# Patient Record
Sex: MALE | Race: WHITE | NOT HISPANIC OR LATINO | Employment: OTHER | URBAN - METROPOLITAN AREA
[De-identification: names, ages, dates, MRNs, and addresses within clinical notes are randomized per-mention and may not be internally consistent; named-entity substitution may affect disease eponyms.]

---

## 2019-01-30 ENCOUNTER — CONVERSION ENCOUNTER (OUTPATIENT)
Dept: FAMILY MEDICINE CLINIC | Facility: CLINIC | Age: 49
End: 2019-01-30

## 2019-01-30 ENCOUNTER — OFFICE VISIT CONVERTED (OUTPATIENT)
Dept: FAMILY MEDICINE CLINIC | Facility: CLINIC | Age: 49
End: 2019-01-30
Attending: NURSE PRACTITIONER

## 2020-02-27 ENCOUNTER — OFFICE VISIT CONVERTED (OUTPATIENT)
Dept: FAMILY MEDICINE CLINIC | Facility: CLINIC | Age: 50
End: 2020-02-27
Attending: NURSE PRACTITIONER

## 2020-03-23 ENCOUNTER — HOSPITAL ENCOUNTER (OUTPATIENT)
Dept: URGENT CARE | Facility: CLINIC | Age: 50
Discharge: HOME | End: 2020-03-23
Attending: NURSE PRACTITIONER

## 2020-03-25 ENCOUNTER — TELEMEDICINE CONVERTED (OUTPATIENT)
Dept: FAMILY MEDICINE CLINIC | Facility: CLINIC | Age: 50
End: 2020-03-25
Attending: NURSE PRACTITIONER

## 2020-03-31 ENCOUNTER — OFFICE VISIT CONVERTED (OUTPATIENT)
Dept: PODIATRY | Facility: CLINIC | Age: 50
End: 2020-03-31
Attending: PODIATRIST

## 2020-04-10 ENCOUNTER — HOSPITAL ENCOUNTER (OUTPATIENT)
Dept: FAMILY MEDICINE CLINIC | Facility: CLINIC | Age: 50
Discharge: HOME | End: 2020-04-10
Attending: NURSE PRACTITIONER

## 2020-04-10 LAB
BASOPHILS # BLD AUTO: 0.1 10*3/UL (ref 0–0.2)
BASOPHILS NFR BLD AUTO: 0.8 % (ref 0–3)
CONV ABS IMM GRAN: 0.04 10*3/UL (ref 0–0.2)
CONV IMMATURE GRAN: 0.3 % (ref 0–1.8)
DEPRECATED RDW RBC AUTO: 48.1 FL (ref 35.1–43.9)
EOSINOPHIL # BLD AUTO: 0.18 10*3/UL (ref 0–0.7)
EOSINOPHIL # BLD AUTO: 1.4 % (ref 0–7)
ERYTHROCYTE [DISTWIDTH] IN BLOOD BY AUTOMATED COUNT: 13.2 % (ref 11.6–14.4)
HCT VFR BLD AUTO: 50.8 % (ref 42–52)
HGB BLD-MCNC: 16.9 G/DL (ref 14–18)
LYMPHOCYTES # BLD AUTO: 3.15 10*3/UL (ref 1–5)
LYMPHOCYTES NFR BLD AUTO: 25.2 % (ref 20–45)
MCH RBC QN AUTO: 32.9 PG (ref 27–31)
MCHC RBC AUTO-ENTMCNC: 33.3 G/DL (ref 33–37)
MCV RBC AUTO: 98.8 FL (ref 80–96)
MONOCYTES # BLD AUTO: 0.85 10*3/UL (ref 0.2–1.2)
MONOCYTES NFR BLD AUTO: 6.8 % (ref 3–10)
NEUTROPHILS # BLD AUTO: 8.16 10*3/UL (ref 2–8)
NEUTROPHILS NFR BLD AUTO: 65.5 % (ref 30–85)
NRBC CBCN: 0 % (ref 0–0.7)
PLATELET # BLD AUTO: 228 10*3/UL (ref 130–400)
PMV BLD AUTO: 10.4 FL (ref 9.4–12.4)
RBC # BLD AUTO: 5.14 10*6/UL (ref 4.7–6.1)
WBC # BLD AUTO: 12.48 10*3/UL (ref 4.8–10.8)

## 2020-11-09 ENCOUNTER — OFFICE VISIT CONVERTED (OUTPATIENT)
Dept: FAMILY MEDICINE CLINIC | Facility: CLINIC | Age: 50
End: 2020-11-09
Attending: NURSE PRACTITIONER

## 2020-11-18 ENCOUNTER — CONVERSION ENCOUNTER (OUTPATIENT)
Dept: FAMILY MEDICINE CLINIC | Facility: CLINIC | Age: 50
End: 2020-11-18

## 2020-11-18 ENCOUNTER — OFFICE VISIT CONVERTED (OUTPATIENT)
Dept: FAMILY MEDICINE CLINIC | Facility: CLINIC | Age: 50
End: 2020-11-18
Attending: NURSE PRACTITIONER

## 2020-11-18 ENCOUNTER — HOSPITAL ENCOUNTER (OUTPATIENT)
Dept: FAMILY MEDICINE CLINIC | Facility: CLINIC | Age: 50
Discharge: HOME | End: 2020-11-18
Attending: NURSE PRACTITIONER

## 2020-11-18 LAB
ALBUMIN SERPL-MCNC: 4.4 G/DL (ref 3.5–5)
ALBUMIN/GLOB SERPL: 1.4 {RATIO} (ref 1.4–2.6)
ALP SERPL-CCNC: 81 U/L (ref 56–119)
ALT SERPL-CCNC: 49 U/L (ref 10–40)
ANION GAP SERPL CALC-SCNC: 20 MMOL/L (ref 8–19)
AST SERPL-CCNC: 29 U/L (ref 15–50)
BASOPHILS # BLD AUTO: 0.1 10*3/UL (ref 0–0.2)
BASOPHILS NFR BLD AUTO: 0.9 % (ref 0–3)
BILIRUB SERPL-MCNC: 0.51 MG/DL (ref 0.2–1.3)
BUN SERPL-MCNC: 11 MG/DL (ref 5–25)
BUN/CREAT SERPL: 11 {RATIO} (ref 6–20)
CALCIUM SERPL-MCNC: 9.9 MG/DL (ref 8.7–10.4)
CHLORIDE SERPL-SCNC: 105 MMOL/L (ref 99–111)
CHOLEST SERPL-MCNC: 185 MG/DL (ref 107–200)
CHOLEST/HDLC SERPL: 4.3 {RATIO} (ref 3–6)
CONV ABS IMM GRAN: 0.05 10*3/UL (ref 0–0.2)
CONV CO2: 23 MMOL/L (ref 22–32)
CONV IMMATURE GRAN: 0.4 % (ref 0–1.8)
CONV TOTAL PROTEIN: 7.6 G/DL (ref 6.3–8.2)
CREAT UR-MCNC: 1.02 MG/DL (ref 0.7–1.2)
DEPRECATED RDW RBC AUTO: 48.1 FL (ref 35.1–43.9)
EOSINOPHIL # BLD AUTO: 0.15 10*3/UL (ref 0–0.7)
EOSINOPHIL # BLD AUTO: 1.3 % (ref 0–7)
ERYTHROCYTE [DISTWIDTH] IN BLOOD BY AUTOMATED COUNT: 13.2 % (ref 11.6–14.4)
GFR SERPLBLD BASED ON 1.73 SQ M-ARVRAT: >60 ML/MIN/{1.73_M2}
GLOBULIN UR ELPH-MCNC: 3.2 G/DL (ref 2–3.5)
GLUCOSE SERPL-MCNC: 86 MG/DL (ref 70–99)
HCT VFR BLD AUTO: 52.6 % (ref 42–52)
HDLC SERPL-MCNC: 43 MG/DL (ref 40–60)
HGB BLD-MCNC: 17.9 G/DL (ref 14–18)
LDLC SERPL CALC-MCNC: 114 MG/DL (ref 70–100)
LYMPHOCYTES # BLD AUTO: 3.38 10*3/UL (ref 1–5)
LYMPHOCYTES NFR BLD AUTO: 29.4 % (ref 20–45)
MCH RBC QN AUTO: 33.7 PG (ref 27–31)
MCHC RBC AUTO-ENTMCNC: 34 G/DL (ref 33–37)
MCV RBC AUTO: 99.1 FL (ref 80–96)
MONOCYTES # BLD AUTO: 0.8 10*3/UL (ref 0.2–1.2)
MONOCYTES NFR BLD AUTO: 7 % (ref 3–10)
NEUTROPHILS # BLD AUTO: 7 10*3/UL (ref 2–8)
NEUTROPHILS NFR BLD AUTO: 61 % (ref 30–85)
NRBC CBCN: 0 % (ref 0–0.7)
OSMOLALITY SERPL CALC.SUM OF ELEC: 297 MOSM/KG (ref 273–304)
PLATELET # BLD AUTO: 245 10*3/UL (ref 130–400)
PMV BLD AUTO: 10.7 FL (ref 9.4–12.4)
POTASSIUM SERPL-SCNC: 4.4 MMOL/L (ref 3.5–5.3)
PSA SERPL-MCNC: 2.48 NG/ML (ref 0–4)
RBC # BLD AUTO: 5.31 10*6/UL (ref 4.7–6.1)
SODIUM SERPL-SCNC: 144 MMOL/L (ref 135–147)
TRIGL SERPL-MCNC: 139 MG/DL (ref 40–150)
TSH SERPL-ACNC: 1.9 M[IU]/L (ref 0.27–4.2)
VLDLC SERPL-MCNC: 28 MG/DL (ref 5–37)
WBC # BLD AUTO: 11.48 10*3/UL (ref 4.8–10.8)

## 2021-05-10 NOTE — H&P
History and Physical      Patient Name: Madi Rehman   Patient ID: [id number]   Sex: Male   YOB: 1970    Primary Care Provider: Audrey CORNEJO   Referring Provider: Audrey CORNEJO    Visit Date: March 31, 2020    Provider: Paresh Hathaway DPM   Location: [facility]   Location Address: [address] [place] [address]  [id number]   Location Phone: [phone]          Chief Complaint  · Right Foot Pain      History Of Present Illness  Madi Rehman presents to the office today for evaluation and treatment of      New, Established, New Problem: New  Location: Right third webspace  Duration: 3-week  Onset: Insidious  Nature: Sore  Stable, worsening, improving: Improved  Aggravating factors:  Patient relates pain is aggravated by shoe gear and ambulation.    Previous Treatment: P.o. antibiotics  Patient denies any fevers, chills, nausea, vomiting, shortness of breathe, nor any other constitutional signs nor symptoms.    Patient states he had edema and erythema in his right foot radiating third webspace.  He states this is significantly improved since taking antibiotics.  He states he has completed these.    Patient is retired from the [employer] and currently works at [employer] on [employer]    Referred from his primary care provider.       Past Medical History  Allergies; Cancer; Heel pain; High blood pressure         Past Surgical History  Toe Removal         Medication List  diclofenac sodium 50 mg oral tablet,delayed release (DR/EC)         Allergy List  NO KNOWN DRUG ALLERGIES         Family Medical History  Heart Disease; Family history of lung cancer         Social History  Alcohol (Current some day); lives with spouse; Tobacco (Current every day)         Review of Systems  · Constitutional  o Denies  o : fatigue, night sweats  · Eyes  o Denies  o : double vision, blurred vision  · HENT  o Denies  o : vertigo, recent head  "injury  · Cardiovascular  o Denies  o : chest pain, irregular heart beats  · Respiratory  o Denies  o : shortness of breath, productive cough  · Gastrointestinal  o Denies  o : nausea, vomiting  · Genitourinary  o Denies  o : dysuria, urinary retention  · Integument  o * See HPI  · Neurologic  o Denies  o : altered mental status, seizures  · Musculoskeletal  o Denies  o : joint swelling, limitation of motion  · Endocrine  o Denies  o : cold intolerance, heat intolerance  · Heme-Lymph  o Denies  o : petechiae, lymph node enlargement or tenderness  · Allergic-Immunologic  o Denies  o : frequent illnesses      Vitals  Date Time BP Position Site L\R Cuff Size HR RR TEMP (F) WT  HT  BMI kg/m2 BSA m2 O2 Sat HC       03/31/2020 10:10 /84 Sitting    89 - R   168lbs 0oz 5'  8\" 25.54 1.91 99 %    03/31/2020 10:10 /81 Sitting                     Physical Examination  · Constitutional  o Appearance  o : well developed, well-nourished, no obvious deformities present  · Cardiovascular  o Peripheral Vascular System  o :   § Pedal Pulses  § : pulses 2 + and symmetrical  § Extremities  § : no edema in lower extremities  · Musculoskeletal  o General  o :   § General Musculoskeletal  § : Lower extremity muscle and strength and range of motion is equal and symmetrical bilaterally. The knees are noted to be normal in alignment. Ankle alignment and range of motion is notmral and foot structure is normal. Subtalar, metatarsal and metatarsal-phalangeal range of motion is noted to be within normal limits. The digits of both feet are in normal alignment. The gait is normal. Well-healed partial amputation of the left second toe. Patient reports this is from cancer when he was a child.  · Skin and Subcutaneous Tissue  o General Inspection  o : Skin is noted to have normal texture and turgor, with no excrescences noted. Macerated tissue is seen in the right third interspace. This tissue is loose. This area was debrided down to " healthy tissue with a #15 scalpel blade. No deep skin lesions. No surrounding edema erythema lymphangitis or signs of action. No fluctuance. 10% Betadine was swabbed on this area.  o Digits and Nails  o : The toenails are noted to be without disese.  · Neurologic  o Sensation  o : Epicritic sensations intact bilaterally.          Assessment  · Cellulitis, toe     681.10/L03.039  · Foot pain, right     729.5/M79.671  · Tinea pedis       Tinea pedis     110.4/B35.3  · Toe amputee     V49.72/Z89.429      Plan  · Medications  o Medications have been Reconciled  o Transition of Care or Provider Policy  · Instructions  o Follow Up as Needed  o I have discussed the findings of this evaluation with the patient. The discussion included a complete verbal explanation of any changes in the examination results, diagnosis, and the current treatment plan. A schedule for future care needs was explained. If any questions should arise after returning home, I have encouraged the patient to feel free to contact Dr. Hathaway. The patient states understanding and agreement with this plan.   o Pt to monitor for problems and to contact Dr. Hathaway for follow-up should such signs occur. Patient states understanding and agreement with this plan.   o Patient instructed to use Betadine, which he states he already has, to the right third webspace daily. A foam spacer was dispensed to keep the third and fourth toes apart. Patient is to continue to do this until the macerated area resolves. After this time the patient was instructed to use OTC Desenex on a daily basis to prevent any recurrent tinea pedis issues. The patient states understanding and agreement with this plan.   o Electronically Identified Patient Education Materials Provided Electronically  · Disposition  o Call or Return if symptoms worsen or persist.  · Referrals  o ID: [id number] Date: 03/25/2020 Type: Inbound  Specialty: Podiatry            Electronically Signed by: Paresh  DARRYN Hathaway -Author on March 31, 2020 10:52:14 AM

## 2021-05-13 NOTE — PROGRESS NOTES
Progress Note      Patient Name: Madi Rehman   Patient ID: [id number]   Sex: Male   YOB: 1970    Primary Care Provider: Emily CORNEJO   Referring Provider: Emily CORNEJO    Visit Date: November 18, 2020    Provider: CHANTAL Wilkinson   Location: [facility]   Location Address: [address] [place] [address], [address] [place] [address]  [id number]   Location Phone: [phone]          Chief Complaint  · yearly checkup      History Of Present Illness  Madi Rehman is a 50 year old /White male who presents for evaluation and treatment of:      He is here for an annual physical.    He has not having labs in a long time.    He has never had a colorectal screening.  He does not have any family history or personal history of colorectal cancer.  He denies any problems with his bowel movements.    He is a current smoker, smokes about 1 pack/day.  He states he would like to quit smoking.  He has tried in the past by doing nicotine patches and he has tried cold turkey.  He states he has tried Wellbutrin in the past which did not work.    He was seen for conjunctivitis last week.  He states his eyes are better but they are still very irritating and dry       Past Medical History  Disease Name Date Onset Notes   Allergies --  --    Cancer --  --    Heel pain --  --    High blood pressure --  --          Past Surgical History  Procedure Name Date Notes   Toe Removal 1978 --          Allergy List  Allergen Name Date Reaction Notes   NO KNOWN DRUG ALLERGIES --  --  --        Allergies Reconciled  Family Medical History  Disease Name Relative/Age Notes   Heart Disease Aunt/  Father/  Mother/  Uncle/   GRANDPARENTS NOT SPECIFIED   Family history of lung cancer Father/   --          Social History  Finding Status Start/Stop Quantity Notes   Alcohol Current some day --/-- --  10 SERVINGS PER WEEK    lives with spouse --  --/-- --  AND SON   Tobacco Current every day  "--/-- 1 PPD 10-15 PER DAY          Immunizations  NameDate Admin Mfg Trade Name Lot Number Route Inj VIS Given VIS Publication   Poorpakqo64/09/2020 [facility] Fluzone Quadrivalent [id number] IM LD 11/09/2020 08/15/2019   Comments: Patient tolerated injection well, left in stable condition.         Review of Systems  · Constitutional  o Denies  o : fever, fatigue, weight loss, weight gain  · Eyes  o Admits  o : eye discomfort  o Denies  o : discharge from eye, eye pain  · Cardiovascular  o Denies  o : lower extremity edema, claudication, chest pressure, palpitations  · Respiratory  o Denies  o : shortness of breath, wheezing, cough, hemoptysis, dyspnea on exertion  · Gastrointestinal  o Denies  o : nausea, vomiting, diarrhea, constipation, abdominal pain  · Genitourinary  o Denies  o : urgency, frequency  · Integument  o Denies  o : rash, itching  · Psychiatric  o Denies  o : anxiety, depression, suicidal ideation, homicidal ideation      Vitals  Date Time BP Position Site L\R Cuff Size HR RR TEMP (F) WT  HT  BMI kg/m2 BSA m2 O2 Sat FR L/min FiO2 HC       11/18/2020 02:18 /82 Sitting    102 - R  97.3 182lbs 6oz 5'  8\" 27.73 1.99 96 %            Physical Examination  · Constitutional  o Appearance  o : no acute distress, well-nourished  · Head and Face  o Head  o :   § Inspection  § : atraumatic, normocephalic  · Eyes  o Eyes  o : no conjunctival injection, No discharge, mild redness noted  · Neck  o Thyroid  o : gland size normal, nontender, no nodules or masses present on palpation, symmetric  · Respiratory  o Respiratory Effort  o : breathing comfortably, symmetric chest rise  o Auscultation of Lungs  o : clear to asculatation bilaterally, no wheezes, rales, or rhonchii  · Cardiovascular  o Heart  o :   § Auscultation of Heart  § : regular rate and rhythm, no murmurs, rubs, or gallops  o Peripheral Vascular System  o :   § Extremities  § : no edema  · Lymphatic  o Neck  o : no lymphadenopathy " present  · Neurologic  o Mental Status Examination  o :   § Orientation  § : grossly oriented to person, place and time  o Gait and Station  o :   § Gait Screening  § : normal gait  · Psychiatric  o General  o : normal mood and affect  o Presence of Abnormal Thoughts  o : no hallucinations, no delusions present, no psychotic thoughts, no homicidal ideation, no suicidal ideation, no evidence of obsessional thinking          Assessment  · Screening for colon cancer     V76.51/Z12.11  We discussed options for colorectal screening, patient is a low risk. He has agreed to do the Cologuard.  · Annual physical exam     V70.0/Z00.00  · Cigarette nicotine dependence without complication     305.1/F17.210  Discussed multiple options for smoking cessation, patient is willing to try Chantix. Discussed potential side effects of Chantix to include nausea/vomiting, nightmares, suicidal ideation. Patient was instructed to notify immediately if any suicidal thoughts, verbalizes understanding. Discussed with patient that he may need to be on Chantix for maximum of 6 months, and a minimum of 3 months.  · Dry eyes     375.15/H04.123  We will start him on Systane eyedrops as needed      Plan  · Orders  o Cologuard (07109) - V76.51/Z12.11 - 11/18/2020  o ACO-17: Screened for tobacco use AND received tobacco cessation intervention (4004F) - - 11/18/2020  o Male Physical Primary Care Panel (CMP, CBC, TSH, Lipid, PSA) [facility] ([id number], 36068, 04805, 32841, 27572, ) - V70.0/Z00.00 - 11/18/2020  o ACO-39: Current medications updated and reviewed (, 1159F) - - 11/18/2020  o ACO-18: Negative screen for clinical depression using a standardized tool () - - 11/18/2020   1 pt  · Medications  o Chantix Starting Month Box 0.5 mg (11)- 1 mg (42) oral tablets,dose pack   SIG: take as directed for 30 days   DISP: (1) Dose Pack with 0 refills  Prescribed on 11/18/2020     o Chantix Continuing Month Box 1 mg oral tablet   SIG: take 1 tablet (1  mg) with glass of water by oral route 2 times per day after meals for 30 days   DISP: (60) Tablet with 2 refills  Prescribed on 11/18/2020     o Systane (PF) 0.4-0.3 % ophthalmic (eye) dropperette   SIG: instill 1-2 drops by ophthalmic route As needed for 30 days   DISP: (1) Bottle with 11 refills  Prescribed on 11/18/2020     o Medications have been Reconciled  o Transition of Care or Provider Policy  · Instructions  o Reviewed health maintenance flowsheet and updated information. Orders were placed and/or patient's response was documented.  o *Form of nicotine being used: Cigarettes  o Patient was strongly encouraged to discontinue use of any nicotine containing product or minimize the use of the product.  o Discussed smoking cessation and counseling with patient for over 3 minutes.  o Patient was educated/instructed on their diagnosis, treatment and medications prior to discharge from the clinic today.  o Patient instructed to seek medical attention urgently for new or worsening symptoms.  o Call the office with any concerns or questions.  · Disposition  o Annual wellness exam in one year            Electronically Signed by: CHANTAL Wilkinson -Author on November 18, 2020 02:57:19 PM

## 2021-05-13 NOTE — PROGRESS NOTES
Progress Note      Patient Name: Madi Rehman   Patient ID: [id number]   Sex: Male   YOB: 1970    Referring Provider: Audrey CORNEJO    Visit Date: November 9, 2020    Provider: CHANTAL Wilkinson   Location: [facility]   Location Address: [address] [place] [address], [address] [place] [address]  [id number]   Location Phone: [phone]          Chief Complaint  · possible pink eye bilaterally      History Of Present Illness  Madi Rehman is a 50 year old /White male who presents for evaluation and treatment of:      Is here for an acute visit today.  He is a previous patient of CHANTAL Woodson.  He is complaining of possible pinkeye bilaterally.  He states he started having itching and irritation on Saturday.  States he did have some dry crusty drainage this morning.       Past Medical History  Disease Name Date Onset Notes   Allergies --  --    Cancer --  --    Heel pain --  --    High blood pressure --  --          Past Surgical History  Procedure Name Date Notes   Toe Removal 1978 --          Allergy List  Allergen Name Date Reaction Notes   NO KNOWN DRUG ALLERGIES --  --  --          Family Medical History  Disease Name Relative/Age Notes   Heart Disease Aunt/  Father/  Mother/  Uncle/   GRANDPARENTS NOT SPECIFIED   Family history of lung cancer Father/   --          Social History  Finding Status Start/Stop Quantity Notes   Alcohol Current some day --/-- --  10 SERVINGS PER WEEK    lives with spouse --  --/-- --  AND SON   Tobacco Current every day --/-- 1 PPD 10-15 PER DAY          Review of Systems  · Constitutional  o Denies  o : fever, fatigue, weight loss, weight gain  · Eyes  o Admits  o : discharge from eye, eye discomfort  · Cardiovascular  o Denies  o : lower extremity edema, claudication, chest pressure, palpitations  · Respiratory  o Denies  o : shortness of breath, wheezing, cough, hemoptysis, dyspnea on exertion  · Gastrointestinal  o Denies  o :  "nausea, vomiting, diarrhea, constipation, abdominal pain  · Integument  o Denies  o : rash, itching      Vitals  Date Time BP Position Site L\R Cuff Size HR RR TEMP (F) WT  HT  BMI kg/m2 BSA m2 O2 Sat FR L/min FiO2 HC       11/09/2020 09:53 /82 Sitting    89 - R  97.8 182lbs 0oz 5'  8\" 27.67 1.99 98 %            Physical Examination  · Constitutional  o Appearance  o : no acute distress, well-nourished  · Head and Face  o Head  o :   § Inspection  § : atraumatic, normocephalic  · Eyes  o Eyes  o : Positive conjunctival injection bilaterally, no drainage noted  · Respiratory  o Respiratory Effort  o : breathing comfortably, symmetric chest rise  o Auscultation of Lungs  o : clear to asculatation bilaterally, no wheezes, rales, or rhonchii  · Cardiovascular  o Heart  o :   § Auscultation of Heart  § : regular rate and rhythm, no murmurs, rubs, or gallops  o Peripheral Vascular System  o :   § Extremities  § : no edema  · Neurologic  o Mental Status Examination  o :   § Orientation  § : grossly oriented to person, place and time  o Gait and Station  o :   § Gait Screening  § : normal gait  · Psychiatric  o General  o : normal mood and affect          Assessment  · Need for influenza vaccination     V04.81/Z23  · Acute conjunctivitis of both eyes, unspecified acute conjunctivitis type     372.00/H10.33  Discussed conjunctivitis (pinkeye) is very contagious, requires frequent hand washing. Instructed to wash hands before instilling eye drops and afterwards to prevent spread of infection. Advised to avoid touching eyes and to wash hands often. Advised to finish all of eye drops and follow-up if not improving or any worsening of symptoms.      Plan  · Orders  o Immunization Admin Fee (Single) ([facility]) (19046) - V04.81/Z23 - 11/09/2020  o Fluzone Quadrivalent Vaccine, age 6 months + (93823) - V04.81/Z23 - 11/09/2020   Vaccine - Influenza; Dose: 0.5; Site: Left Deltoid; Route: Intramuscular; Date: 11/09/2020 10:11:00; " Exp: 06/30/2021; Lot: [id number]; Mfg: sanofi pasteur; TradeName: Fluzone Quadrivalent; Administered By: Floyd Martinez MA; Comment: Patient tolerated injection well, left in stable condition.  o ACO-39: Current medications updated and reviewed (, 1159F) - - 11/09/2020  · Medications  o ofloxacin 0.3 % ophthalmic (eye) drops   SIG: instill 2 drops into both eyes by ophthalmic route 4 times per day for 10 days   DISP: (10) Milliliter with 0 refills  Prescribed on 11/09/2020     o Medications have been Reconciled  o Transition of Care or Provider Policy  · Instructions  o Patient was educated/instructed on their diagnosis, treatment and medications prior to discharge from the clinic today.  o Patient instructed to seek medical attention urgently for new or worsening symptoms.  o Call the office with any concerns or questions.  o I recommended he schedule a preventative/annual wellness visit.  · Disposition  o Call or Return if symptoms worsen or persist.            Electronically Signed by: Emily Moran APRN -Author on November 9, 2020 10:18:33 AM

## 2021-05-14 VITALS
TEMPERATURE: 97.8 F | HEART RATE: 89 BPM | HEIGHT: 68 IN | DIASTOLIC BLOOD PRESSURE: 82 MMHG | SYSTOLIC BLOOD PRESSURE: 116 MMHG | OXYGEN SATURATION: 98 % | WEIGHT: 182 LBS | BODY MASS INDEX: 27.58 KG/M2

## 2021-05-14 VITALS
WEIGHT: 182.37 LBS | SYSTOLIC BLOOD PRESSURE: 142 MMHG | HEIGHT: 68 IN | TEMPERATURE: 97.3 F | BODY MASS INDEX: 27.64 KG/M2 | DIASTOLIC BLOOD PRESSURE: 82 MMHG | OXYGEN SATURATION: 96 % | HEART RATE: 102 BPM

## 2021-05-15 VITALS
TEMPERATURE: 98 F | HEIGHT: 68 IN | BODY MASS INDEX: 24.78 KG/M2 | OXYGEN SATURATION: 97 % | HEART RATE: 90 BPM | DIASTOLIC BLOOD PRESSURE: 88 MMHG | SYSTOLIC BLOOD PRESSURE: 132 MMHG | WEIGHT: 163.5 LBS

## 2021-05-15 VITALS
OXYGEN SATURATION: 99 % | SYSTOLIC BLOOD PRESSURE: 150 MMHG | HEART RATE: 89 BPM | DIASTOLIC BLOOD PRESSURE: 84 MMHG | HEIGHT: 68 IN | WEIGHT: 168 LBS | BODY MASS INDEX: 25.46 KG/M2

## 2021-05-15 VITALS
HEART RATE: 89 BPM | OXYGEN SATURATION: 95 % | DIASTOLIC BLOOD PRESSURE: 62 MMHG | BODY MASS INDEX: 27.92 KG/M2 | HEIGHT: 68 IN | WEIGHT: 184.25 LBS | SYSTOLIC BLOOD PRESSURE: 134 MMHG

## 2021-05-28 ENCOUNTER — OFFICE VISIT CONVERTED (OUTPATIENT)
Dept: FAMILY MEDICINE CLINIC | Facility: CLINIC | Age: 51
End: 2021-05-28
Attending: NURSE PRACTITIONER

## 2021-06-05 NOTE — PROGRESS NOTES
Progress Note      Patient Name: Madi Rehman   Patient ID: [id number]   Sex: Male   YOB: 1970    Primary Care Provider: Emily CORNEJO   Referring Provider: Emily CORNEJO    Visit Date: May 28, 2021    Provider: CHANTAL Wilkinson   Location: [facility]   Location Address: [address] [place] [address], [address] [place] [address]  [id number]   Location Phone: [phone]          Chief Complaint  · nasal congestion  · chest congestion and cough      History Of Present Illness  Madi Rehman is a 50 year old /White male who presents for evaluation and treatment of:      He is here for an acute visit today complaining of head congestion that is also draining to his chest.  He states this started about 3 weeks ago.  He states he can hear rattling sounds when he breathes.  He is a current smoker, smokes about 1 pack/day.  He states he never did get the Chantix prescription that was prescribed in November.  He states the pharmacy told him is waiting on a PA however we never did receive the PA request.  He would like to try Chantix.  He has tried nicotine patches in the past that did not work.  He is also tried going cold turkey and has tried Wellbutrin which did not work.  He states that he was planning on trying to quit starting on Monday.  He states he has several friends who are going to all quit at the same time.       Past Medical History  Disease Name Date Onset Notes   Allergies --  --    Cancer --  --    Dry eyes 11/18/2020 --    Heel pain --  --    High blood pressure --  --          Past Surgical History  Procedure Name Date Notes   Colonoscopy 12/28/2020 12/28/2020- Cologuard negative   Toe Removal 1978 --          Medication List  Name Date Started Instructions   Chantix Continuing Month Box 1 mg oral tablet 05/28/2021 take 1 tablet (1 mg) with glass of water by oral route 2 times per day after meals for 30 days   Chantix Starting Month Box 0.5  "mg (11)- 1 mg (42) oral tablets,dose pack 05/28/2021 take as directed for 30 days         Allergy List  Allergen Name Date Reaction Notes   NO KNOWN DRUG ALLERGIES --  --  --        Allergies Reconciled  Family Medical History  Disease Name Relative/Age Notes   Heart Disease Aunt/  Father/  Mother/  Uncle/   GRANDPARENTS NOT SPECIFIED   Family history of lung cancer Father/   --          Social History  Finding Status Start/Stop Quantity Notes   Alcohol Current some day --/-- --  10 SERVINGS PER WEEK    lives with spouse --  --/-- --  AND SON   Tobacco Current every day --/-- 1 PPD 10-15 PER DAY          Immunizations  NameDate Admin Mfg Trade Name Lot Number Route Inj VIS Given VIS Publication   Kurvsjbll74/09/2020 PMC Fluzone Quadrivalent [id number] IM LD 11/09/2020 08/15/2019   Comments: Patient tolerated injection well, left in stable condition.         Review of Systems  · Constitutional  o Denies  o : fever, fatigue, weight loss, weight gain  · HENT  o Admits  o : nasal congestion, nasal discharge, postnasal drip  o Denies  o : sinus pain, sore throat, ear pain  · Cardiovascular  o Denies  o : lower extremity edema, claudication, chest pressure, palpitations  · Respiratory  o Admits  o : wheezing  o Denies  o : shortness of breath, cough, hemoptysis, dyspnea on exertion  · Gastrointestinal  o Denies  o : nausea, vomiting, diarrhea, constipation, abdominal pain      Vitals  Date Time BP Position Site L\R Cuff Size HR RR TEMP (F) WT  HT  BMI kg/m2 BSA m2 O2 Sat FR L/min FiO2 HC       05/28/2021 10:13 /78 Sitting    87 - R  98.4 185lbs 6oz 5'  8\" 28.19 2.01 97 %            Physical Examination  · Constitutional  o Appearance  o : no acute distress, well-nourished  · Head and Face  o Head  o :   § Inspection  § : atraumatic, normocephalic  · Ears, Nose, Mouth and Throat  o Ears  o :   § External Ears  § : normal  § Otoscopic Examination  § : tympanic membrane appearance within normal limits bilaterally  o Oral " Cavity  o :   § Oral Mucosa  § : moist mucous membranes  o Throat  o :   § Oropharynx  § : no inflammation or lesions present, tonsils within normal limits  · Neck  o Thyroid  o : gland size normal, nontender, no nodules or masses present on palpation, symmetric  · Respiratory  o Respiratory Effort  o : breathing comfortably, symmetric chest rise  o Auscultation of Lungs  o : clear to asculatation bilaterally, no wheezes, rales, or rhonchii  · Cardiovascular  o Heart  o :   § Auscultation of Heart  § : regular rate and rhythm, no murmurs, rubs, or gallops  o Peripheral Vascular System  o :   § Extremities  § : no edema  · Lymphatic  o Neck  o : bilateral nontender mobile soft lymphadenopathy present   · Neurologic  o Mental Status Examination  o :   § Orientation  § : grossly oriented to person, place and time  o Gait and Station  o :   § Gait Screening  § : normal gait  · Psychiatric  o General  o : normal mood and affect          Assessment  · Allergic rhinitis due to allergen     477.9/J30.9  I discussed since he is having the drainage that he should take an antihistamine such as loratadine daily during the allergy season. I will send him in some loratadine.  · Bronchitis, acute     466.0/J20.9  Given the length of time since onset of symptoms I will treat him with azithromycin.  · Cigarette nicotine dependence without complication     305.1/F17.210  Discussed multiple options for smoking cessation, patient is willing to try Chantix. Discussed potential side effects of Chantix to include nausea/vomiting, nightmares, suicidal ideation. Patient was instructed to notify immediately if any suicidal thoughts, verbalizes understanding. Discussed with patient that he may need to be on Chantix for maximum of 6 months, and a minimum of 3 months.      Plan  · Orders  o ACO-17: Screened for tobacco use AND received tobacco cessation intervention (4004F) - - 05/28/2021  o ACO-39: Current medications updated and reviewed  (1159F, ) - - 05/28/2021  · Medications  o Zithromax Z-Jorje 250 mg oral tablet   SIG: take 2 tablets (500 mg) by oral route once daily for 1 day then 1 tablet (250 mg) by oral route once daily for 4 days   DISP: (6) Tablet with 0 refills  Prescribed on 05/28/2021     o loratadine 10 mg oral tablet   SIG: take 1 tablet (10 mg) by oral route once daily for 30 days   DISP: (30) Tablet with 2 refills  Prescribed on 05/28/2021     o Chantix Continuing Month Box 1 mg oral tablet   SIG: take 1 tablet (1 mg) with glass of water by oral route 2 times per day after meals for 30 days   DISP: (60) Tablet with 2 refills  Adjusted on 05/28/2021     o Chantix Starting Month Box 0.5 mg (11)- 1 mg (42) oral tablets,dose pack   SIG: take as directed for 30 days   DISP: (1) Dose Pack with 0 refills  Adjusted on 05/28/2021     o Medications have been Reconciled  o Transition of Care or Provider Policy  · Instructions  o *Form of nicotine being used: Cigarettes  o Patient was strongly encouraged to discontinue use of any nicotine containing product or minimize the use of the product.  o Discussed smoking cessation and counseling with patient for over 3 minutes.  o Patient was educated/instructed on their diagnosis, treatment and medications prior to discharge from the clinic today.  o Patient instructed to seek medical attention urgently for new or worsening symptoms.  o Call the office with any concerns or questions.  · Disposition  o Call or Return if symptoms worsen or persist.            Electronically Signed by: CHANTAL Wilkinson -Author on May 28, 2021 10:46:27 AM

## 2021-07-15 VITALS
HEART RATE: 87 BPM | WEIGHT: 185.37 LBS | BODY MASS INDEX: 28.09 KG/M2 | OXYGEN SATURATION: 97 % | DIASTOLIC BLOOD PRESSURE: 78 MMHG | TEMPERATURE: 98.4 F | SYSTOLIC BLOOD PRESSURE: 150 MMHG | HEIGHT: 68 IN

## 2021-09-21 RX ORDER — LORATADINE 10 MG/1
TABLET ORAL
Qty: 30 TABLET | Refills: 2 | Status: SHIPPED | OUTPATIENT
Start: 2021-09-21 | End: 2022-05-11

## 2021-10-21 ENCOUNTER — CLINICAL SUPPORT (OUTPATIENT)
Dept: FAMILY MEDICINE CLINIC | Facility: CLINIC | Age: 51
End: 2021-10-21

## 2021-10-21 DIAGNOSIS — Z23 NEED FOR INFLUENZA VACCINATION: Primary | ICD-10-CM

## 2021-10-21 PROCEDURE — 90471 IMMUNIZATION ADMIN: CPT | Performed by: NURSE PRACTITIONER

## 2021-10-21 PROCEDURE — 90686 IIV4 VACC NO PRSV 0.5 ML IM: CPT | Performed by: NURSE PRACTITIONER

## 2022-04-25 RX ORDER — LORATADINE 10 MG/1
TABLET ORAL
Qty: 30 TABLET | Refills: 2 | OUTPATIENT
Start: 2022-04-25

## 2022-05-11 ENCOUNTER — OFFICE VISIT (OUTPATIENT)
Dept: FAMILY MEDICINE CLINIC | Facility: CLINIC | Age: 52
End: 2022-05-11

## 2022-05-11 VITALS
HEIGHT: 68 IN | OXYGEN SATURATION: 99 % | BODY MASS INDEX: 30.31 KG/M2 | TEMPERATURE: 98.1 F | SYSTOLIC BLOOD PRESSURE: 148 MMHG | WEIGHT: 200 LBS | HEART RATE: 87 BPM | DIASTOLIC BLOOD PRESSURE: 78 MMHG

## 2022-05-11 DIAGNOSIS — Z23 NEED FOR SHINGLES VACCINE: ICD-10-CM

## 2022-05-11 DIAGNOSIS — Z23 NEED FOR PNEUMOCOCCAL VACCINATION: ICD-10-CM

## 2022-05-11 DIAGNOSIS — Z12.2 SCREENING FOR LUNG CANCER: ICD-10-CM

## 2022-05-11 DIAGNOSIS — F17.200 SMOKER: ICD-10-CM

## 2022-05-11 DIAGNOSIS — Z12.5 SCREENING FOR PROSTATE CANCER: ICD-10-CM

## 2022-05-11 DIAGNOSIS — J30.2 SEASONAL ALLERGIES: ICD-10-CM

## 2022-05-11 DIAGNOSIS — Z11.59 NEED FOR HEPATITIS C SCREENING TEST: ICD-10-CM

## 2022-05-11 DIAGNOSIS — Z00.00 ANNUAL PHYSICAL EXAM: Primary | ICD-10-CM

## 2022-05-11 DIAGNOSIS — E66.09 CLASS 1 OBESITY DUE TO EXCESS CALORIES WITHOUT SERIOUS COMORBIDITY WITH BODY MASS INDEX (BMI) OF 30.0 TO 30.9 IN ADULT: ICD-10-CM

## 2022-05-11 PROBLEM — C80.1 CANCER: Status: ACTIVE | Noted: 2022-05-11

## 2022-05-11 PROBLEM — J01.80 OTHER ACUTE SINUSITIS: Status: ACTIVE | Noted: 2022-05-11

## 2022-05-11 PROBLEM — I10 HIGH BLOOD PRESSURE: Status: ACTIVE | Noted: 2022-05-11

## 2022-05-11 PROBLEM — M79.673 HEEL PAIN: Status: ACTIVE | Noted: 2022-05-11

## 2022-05-11 PROBLEM — H04.123 DRY EYES: Status: ACTIVE | Noted: 2020-11-18

## 2022-05-11 LAB
ALBUMIN SERPL-MCNC: 4.9 G/DL (ref 3.5–5.2)
ALBUMIN/GLOB SERPL: 1.7 G/DL
ALP SERPL-CCNC: 75 U/L (ref 39–117)
ALT SERPL W P-5'-P-CCNC: 42 U/L (ref 1–41)
ANION GAP SERPL CALCULATED.3IONS-SCNC: 13.5 MMOL/L (ref 5–15)
AST SERPL-CCNC: 27 U/L (ref 1–40)
BASOPHILS # BLD AUTO: 0.1 10*3/MM3 (ref 0–0.2)
BASOPHILS NFR BLD AUTO: 1 % (ref 0–1.5)
BILIRUB SERPL-MCNC: 0.4 MG/DL (ref 0–1.2)
BUN SERPL-MCNC: 12 MG/DL (ref 6–20)
BUN/CREAT SERPL: 13.8 (ref 7–25)
CALCIUM SPEC-SCNC: 10.2 MG/DL (ref 8.6–10.5)
CHLORIDE SERPL-SCNC: 101 MMOL/L (ref 98–107)
CHOLEST SERPL-MCNC: 207 MG/DL (ref 0–200)
CO2 SERPL-SCNC: 22.5 MMOL/L (ref 22–29)
CREAT SERPL-MCNC: 0.87 MG/DL (ref 0.76–1.27)
DEPRECATED RDW RBC AUTO: 43.7 FL (ref 37–54)
EGFRCR SERPLBLD CKD-EPI 2021: 103.8 ML/MIN/1.73
EOSINOPHIL # BLD AUTO: 0.19 10*3/MM3 (ref 0–0.4)
EOSINOPHIL NFR BLD AUTO: 1.8 % (ref 0.3–6.2)
ERYTHROCYTE [DISTWIDTH] IN BLOOD BY AUTOMATED COUNT: 12.5 % (ref 12.3–15.4)
GLOBULIN UR ELPH-MCNC: 2.9 GM/DL
GLUCOSE SERPL-MCNC: 98 MG/DL (ref 65–99)
HCT VFR BLD AUTO: 50.2 % (ref 37.5–51)
HCV AB SER DONR QL: NORMAL
HDLC SERPL-MCNC: 35 MG/DL (ref 40–60)
HGB BLD-MCNC: 17.9 G/DL (ref 13–17.7)
IMM GRANULOCYTES # BLD AUTO: 0.07 10*3/MM3 (ref 0–0.05)
IMM GRANULOCYTES NFR BLD AUTO: 0.7 % (ref 0–0.5)
LDLC SERPL CALC-MCNC: 145 MG/DL (ref 0–100)
LDLC/HDLC SERPL: 4.08 {RATIO}
LYMPHOCYTES # BLD AUTO: 3.03 10*3/MM3 (ref 0.7–3.1)
LYMPHOCYTES NFR BLD AUTO: 29.1 % (ref 19.6–45.3)
MCH RBC QN AUTO: 34.2 PG (ref 26.6–33)
MCHC RBC AUTO-ENTMCNC: 35.7 G/DL (ref 31.5–35.7)
MCV RBC AUTO: 95.8 FL (ref 79–97)
MONOCYTES # BLD AUTO: 0.95 10*3/MM3 (ref 0.1–0.9)
MONOCYTES NFR BLD AUTO: 9.1 % (ref 5–12)
NEUTROPHILS NFR BLD AUTO: 58.3 % (ref 42.7–76)
NEUTROPHILS NFR BLD AUTO: 6.06 10*3/MM3 (ref 1.7–7)
NRBC BLD AUTO-RTO: 0 /100 WBC (ref 0–0.2)
PLATELET # BLD AUTO: 227 10*3/MM3 (ref 140–450)
PMV BLD AUTO: 10.3 FL (ref 6–12)
POTASSIUM SERPL-SCNC: 4.3 MMOL/L (ref 3.5–5.2)
PROT SERPL-MCNC: 7.8 G/DL (ref 6–8.5)
PSA SERPL-MCNC: 0.66 NG/ML (ref 0–4)
RBC # BLD AUTO: 5.24 10*6/MM3 (ref 4.14–5.8)
SODIUM SERPL-SCNC: 137 MMOL/L (ref 136–145)
TRIGL SERPL-MCNC: 146 MG/DL (ref 0–150)
TSH SERPL DL<=0.05 MIU/L-ACNC: 2.24 UIU/ML (ref 0.27–4.2)
VLDLC SERPL-MCNC: 27 MG/DL (ref 5–40)
WBC NRBC COR # BLD: 10.4 10*3/MM3 (ref 3.4–10.8)

## 2022-05-11 PROCEDURE — 85025 COMPLETE CBC W/AUTO DIFF WBC: CPT | Performed by: NURSE PRACTITIONER

## 2022-05-11 PROCEDURE — 84443 ASSAY THYROID STIM HORMONE: CPT | Performed by: NURSE PRACTITIONER

## 2022-05-11 PROCEDURE — 90677 PCV20 VACCINE IM: CPT | Performed by: NURSE PRACTITIONER

## 2022-05-11 PROCEDURE — 86803 HEPATITIS C AB TEST: CPT | Performed by: NURSE PRACTITIONER

## 2022-05-11 PROCEDURE — 90471 IMMUNIZATION ADMIN: CPT | Performed by: NURSE PRACTITIONER

## 2022-05-11 PROCEDURE — 36415 COLL VENOUS BLD VENIPUNCTURE: CPT | Performed by: NURSE PRACTITIONER

## 2022-05-11 PROCEDURE — 80053 COMPREHEN METABOLIC PANEL: CPT | Performed by: NURSE PRACTITIONER

## 2022-05-11 PROCEDURE — 80061 LIPID PANEL: CPT | Performed by: NURSE PRACTITIONER

## 2022-05-11 PROCEDURE — G0103 PSA SCREENING: HCPCS | Performed by: NURSE PRACTITIONER

## 2022-05-11 PROCEDURE — 99214 OFFICE O/P EST MOD 30 MIN: CPT | Performed by: NURSE PRACTITIONER

## 2022-05-11 PROCEDURE — 99396 PREV VISIT EST AGE 40-64: CPT | Performed by: NURSE PRACTITIONER

## 2022-05-11 RX ORDER — ALBUTEROL SULFATE 90 UG/1
2 AEROSOL, METERED RESPIRATORY (INHALATION) EVERY 4 HOURS PRN
Qty: 8 G | Refills: 5 | Status: SHIPPED | OUTPATIENT
Start: 2022-05-11

## 2022-05-11 RX ORDER — CETIRIZINE HYDROCHLORIDE 10 MG/1
10 TABLET ORAL DAILY
Qty: 90 TABLET | Refills: 3 | Status: SHIPPED | OUTPATIENT
Start: 2022-05-11

## 2022-05-11 RX ORDER — ZOSTER VACCINE RECOMBINANT, ADJUVANTED 50 MCG/0.5
0.5 KIT INTRAMUSCULAR ONCE
Qty: 1 EACH | Refills: 1 | Status: SHIPPED | OUTPATIENT
Start: 2022-05-30 | End: 2022-05-30

## 2022-05-11 NOTE — PATIENT INSTRUCTIONS
"Healthy Eating  Following a healthy eating pattern may help you to achieve and maintain a healthy body weight, reduce the risk of chronic disease, and live a long and productive life. It is important to follow a healthy eating pattern at an appropriate calorie level for your body. Your nutritional needs should be met primarily through food by choosing a variety of nutrient-rich foods.  What are tips for following this plan?  Reading food labels  Read labels and choose the following:  Reduced or low sodium.  Juices with 100% fruit juice.  Foods with low saturated fats and high polyunsaturated and monounsaturated fats.  Foods with whole grains, such as whole wheat, cracked wheat, brown rice, and wild rice.  Whole grains that are fortified with folic acid. This is recommended for women who are pregnant or who want to become pregnant.  Read labels and avoid the following:  Foods with a lot of added sugars. These include foods that contain brown sugar, corn sweetener, corn syrup, dextrose, fructose, glucose, high-fructose corn syrup, honey, invert sugar, lactose, malt syrup, maltose, molasses, raw sugar, sucrose, trehalose, or turbinado sugar.  Do not eat more than the following amounts of added sugar per day:  6 teaspoons (25 g) for women.  9 teaspoons (38 g) for men.  Foods that contain processed or refined starches and grains.  Refined grain products, such as white flour, degermed cornmeal, white bread, and white rice.  Shopping  Choose nutrient-rich snacks, such as vegetables, whole fruits, and nuts. Avoid high-calorie and high-sugar snacks, such as potato chips, fruit snacks, and candy.  Use oil-based dressings and spreads on foods instead of solid fats such as butter, stick margarine, or cream cheese.  Limit pre-made sauces, mixes, and \"instant\" products such as flavored rice, instant noodles, and ready-made pasta.  Try more plant-protein sources, such as tofu, tempeh, black beans, edamame, lentils, nuts, and " seeds.  Explore eating plans such as the Mediterranean diet or vegetarian diet.  Cooking  Use oil to sauté or stir-suarez foods instead of solid fats such as butter, stick margarine, or lard.  Try baking, boiling, grilling, or broiling instead of frying.  Remove the fatty part of meats before cooking.  Steam vegetables in water or broth.  Meal planning    At meals, imagine dividing your plate into fourths:  One-half of your plate is fruits and vegetables.  One-fourth of your plate is whole grains.  One-fourth of your plate is protein, especially lean meats, poultry, eggs, tofu, beans, or nuts.  Include low-fat dairy as part of your daily diet.    Lifestyle  Choose healthy options in all settings, including home, work, school, restaurants, or stores.  Prepare your food safely:  Wash your hands after handling raw meats.  Keep food preparation surfaces clean by regularly washing with hot, soapy water.  Keep raw meats separate from ready-to-eat foods, such as fruits and vegetables.  , meat, poultry, and eggs to the recommended internal temperature.  Store foods at safe temperatures. In general:  Keep cold foods at 40°F (4.4°C) or below.  Keep hot foods at 140°F (60°C) or above.  Keep your freezer at 0°F (-17.8°C) or below.  Foods are no longer safe to eat when they have been between the temperatures of 40°-140°F (4.4-60°C) for more than 2 hours.  What foods should I eat?  Fruits  Aim to eat 2 cup-equivalents of fresh, canned (in natural juice), or frozen fruits each day. Examples of 1 cup-equivalent of fruit include 1 small apple, 8 large strawberries, 1 cup canned fruit, ½ cup dried fruit, or 1 cup 100% juice.  Vegetables  Aim to eat 2½-3 cup-equivalents of fresh and frozen vegetables each day, including different varieties and colors. Examples of 1 cup-equivalent of vegetables include 2 medium carrots, 2 cups raw, leafy greens, 1 cup chopped vegetable (raw or cooked), or 1 medium baked potato.  Grains  Aim to  eat 6 ounce-equivalents of whole grains each day. Examples of 1 ounce-equivalent of grains include 1 slice of bread, 1 cup ready-to-eat cereal, 3 cups popcorn, or ½ cup cooked rice, pasta, or cereal.  Meats and other proteins  Aim to eat 5-6 ounce-equivalents of protein each day. Examples of 1 ounce-equivalent of protein include 1 egg, 1/2 cup nuts or seeds, or 1 tablespoon (16 g) peanut butter. A cut of meat or fish that is the size of a deck of cards is about 3-4 ounce-equivalents.  Of the protein you eat each week, try to have at least 8 ounces come from seafood. This includes salmon, trout, herring, and anchovies.  Dairy  Aim to eat 3 cup-equivalents of fat-free or low-fat dairy each day. Examples of 1 cup-equivalent of dairy include 1 cup (240 mL) milk, 8 ounces (250 g) yogurt, 1½ ounces (44 g) natural cheese, or 1 cup (240 mL) fortified soy milk.  Fats and oils  Aim for about 5 teaspoons (21 g) per day. Choose monounsaturated fats, such as canola and olive oils, avocados, peanut butter, and most nuts, or polyunsaturated fats, such as sunflower, corn, and soybean oils, walnuts, pine nuts, sesame seeds, sunflower seeds, and flaxseed.  Beverages  Aim for six 8-oz glasses of water per day. Limit coffee to three to five 8-oz cups per day.  Limit caffeinated beverages that have added calories, such as soda and energy drinks.  Limit alcohol intake to no more than 1 drink a day for nonpregnant women and 2 drinks a day for men. One drink equals 12 oz of beer (355 mL), 5 oz of wine (148 mL), or 1½ oz of hard liquor (44 mL).  Seasoning and other foods  Avoid adding excess amounts of salt to your foods. Try flavoring foods with herbs and spices instead of salt.  Avoid adding sugar to foods.  Try using oil-based dressings, sauces, and spreads instead of solid fats.  This information is based on general [place] nutrition guidelines. For more information, visit choosemyplate.gov. Exact amounts may vary based on your nutrition  needs.  Summary  A healthy eating plan may help you to maintain a healthy weight, reduce the risk of chronic diseases, and stay active throughout your life.  Plan your meals. Make sure you eat the right portions of a variety of nutrient-rich foods.  Try baking, boiling, grilling, or broiling instead of frying.  Choose healthy options in all settings, including home, work, school, restaurants, or stores.  This information is not intended to replace advice given to you by your health care provider. Make sure you discuss any questions you have with your health care provider.  Document Revised: 04/01/2019 Document Reviewed: 04/01/2019  Elsevier Patient Education © 2021 [employer]

## 2022-05-11 NOTE — PROGRESS NOTES
"Chief Complaint  Med Refill, Annual Exam, and Allergic Rhinitis    Subjective          Madi Rehman presents to [facility]  History of Present Illness   51-year-old male presents today for an annual physical and med refills.    Seasonal allergies: He states that he does not feel the loratadine is helping.  He complains of some chest congestion.    He has not had the Shingrix vaccine.  He has not had a pneumonia vaccine.  He has not had hepatitis C screening.  He is due for PSA.    Obesity: He is complaining that he has gained weight, he has gained 15 pounds since his last visit a year ago.  He had a Cologuard that was negative on 12/31/2020.    He is complaining of lower back pain for the past 2 months.  He states he has a history of an old fracture in his back years ago.  He states he has been using some over-the-counter arthritis cream that has helped.    [facility]  He states that he has obstructive sleep apnea and has an old CPAP.  He states he does not wear his CPAP.  He states he would like to lose weight and he has gained weight and is overweight.    He is a current smoker, smokes about 1 pack/day for at least 30 years.  He is not ready to quit smoking at this time.  He has not had a lung cancer screening.    Madi Rehman  reports that he has been smoking cigarettes. He started smoking about 35 years ago. He has a 35.00 pack-year smoking history. He has never used smokeless tobacco.. I have educated him on the risk of diseases from using tobacco products such as cancer, COPD and heart disease.     I advised him to quit and he is not willing to quit.    I spent 5 minutes counseling the patient.    Objective   Vital Signs:   /78   Pulse 87   Temp 98.1 °F (36.7 °C)   Ht 172.7 cm (68\")   Wt 90.7 kg (200 lb)   SpO2 99%   BMI 30.41 kg/m²     Physical Exam  Vitals reviewed.   Constitutional:       Appearance: Normal appearance. He is well-developed. "   HENT:      Right Ear: Tympanic membrane, ear canal and external ear normal.      Left Ear: Tympanic membrane, ear canal and external ear normal.      Mouth/Throat:      Mouth: Mucous membranes are moist.      Pharynx: No pharyngeal swelling, oropharyngeal exudate or posterior oropharyngeal erythema.      Comments: Postnasal drainage noted.  Neck:      Thyroid: No thyroid mass, thyromegaly or thyroid tenderness.   Cardiovascular:      Rate and Rhythm: Normal rate and regular rhythm.      Heart sounds: No murmur heard.    No friction rub. No gallop.   Pulmonary:      Effort: Pulmonary effort is normal.      Breath sounds: Normal breath sounds. No wheezing or rhonchi.   Lymphadenopathy:      Cervical: No cervical adenopathy.   Skin:     General: Skin is warm and dry.   Neurological:      Mental Status: He is alert and oriented to person, place, and time.      Cranial Nerves: No cranial nerve deficit.   Psychiatric:         Mood and Affect: Mood and affect normal.         Behavior: Behavior normal.         Thought Content: Thought content normal. Thought content does not include homicidal or suicidal ideation.         Judgment: Judgment normal.        Result Review :                 Assessment and Plan    Diagnoses and all orders for this visit:    1. Annual physical exam (Primary)  Comments:  We discussed Shingrix vaccine, pneumonia vaccine, hepatitis C screening, lung cancer screening, and PSA screening.  Orders:  -     Hepatitis C Antibody  -     PSA Screen  -     CBC Auto Differential  -     Comprehensive Metabolic Panel  -     Lipid Panel  -     TSH Rfx On Abnormal To Free T4  -      CT Chest Low Dose Cancer Screening WO; Future    2. Seasonal allergies  Assessment & Plan:  Seasonal allergies are not well controlled, will have him follow-up loratadine and start him on Zyrtec once daily.  I will start him on albuterol inhaler to help with his wheezing and congestion that he has in the evening.      3. Class 1  obesity due to excess calories without serious comorbidity with body mass index (BMI) of 30.0 to 30.9 in adult  Assessment & Plan:  Patient's (Body mass index is 30.41 kg/m².) indicates that they are obese (BMI >30) with health conditions that include obstructive sleep apnea . Weight is newly identified. BMI is is above average; BMI management plan is completed. We discussed portion control and increasing exercise.       4. Need for hepatitis C screening test  -     Hepatitis C Antibody    5. Screening for prostate cancer  -     PSA Screen    6. Smoker  Assessment & Plan:  I recommend that he quit smoking, patient did not ready to quit at this time.    Orders:  -      CT Chest Low Dose Cancer Screening WO; Future    7. Screening for lung cancer  Comments:  We discussed low-dose CT scan chest, order placed.  Orders:  -      CT Chest Low Dose Cancer Screening WO; Future    8. Need for pneumococcal vaccination  Comments:  Pneumococcal 20 been given today.    9. Need for shingles vaccine  Comments:  Discussed shingles vaccine is 2 doses 2-6 months apart, advised no other vaccine within 2 weeks.    Other orders  -     Zoster Vac Recomb Adjuvanted (Shingrix) 50 MCG/0.5ML reconstituted suspension; Inject 0.5 mL into the appropriate muscle as directed by prescriber 1 (One) Time for 1 dose.  Dispense: 1 each; Refill: 1  -     Pneumococcal Conjugate Vaccine 20-Valent (PCV20)  -     cetirizine (zyrTEC) 10 MG tablet; Take 1 tablet by mouth Daily.  Dispense: 90 tablet; Refill: 3  -     albuterol sulfate  (90 Base) MCG/ACT inhaler; Inhale 2 puffs Every 4 (Four) Hours As Needed for Wheezing or Shortness of Air (congestion/cough).  Dispense: 8 g; Refill: 5      Follow Up   Return in about 6 months (around 11/11/2022) for Next scheduled follow up with [place].  Patient was given instructions and counseling regarding his condition or for health maintenance advice. Please see specific information pulled into the AVS if  appropriate.

## 2022-05-12 PROBLEM — E66.09 CLASS 1 OBESITY DUE TO EXCESS CALORIES WITHOUT SERIOUS COMORBIDITY WITH BODY MASS INDEX (BMI) OF 30.0 TO 30.9 IN ADULT: Status: ACTIVE | Noted: 2022-05-12

## 2022-05-12 PROBLEM — F17.200 SMOKER: Status: ACTIVE | Noted: 2022-05-12

## 2022-05-12 PROBLEM — E66.811 CLASS 1 OBESITY DUE TO EXCESS CALORIES WITHOUT SERIOUS COMORBIDITY WITH BODY MASS INDEX (BMI) OF 30.0 TO 30.9 IN ADULT: Status: ACTIVE | Noted: 2022-05-12

## 2022-05-12 PROBLEM — J30.2 SEASONAL ALLERGIES: Status: ACTIVE | Noted: 2022-05-12

## 2022-05-12 NOTE — ASSESSMENT & PLAN NOTE
Seasonal allergies are not well controlled, will have him follow-up loratadine and start him on Zyrtec once daily.  I will start him on albuterol inhaler to help with his wheezing and congestion that he has in the evening.

## 2022-05-12 NOTE — ASSESSMENT & PLAN NOTE
Patient's (Body mass index is 30.41 kg/m².) indicates that they are obese (BMI >30) with health conditions that include obstructive sleep apnea . Weight is newly identified. BMI is is above average; BMI management plan is completed. We discussed portion control and increasing exercise.

## 2022-05-13 ENCOUNTER — TRANSCRIBE ORDERS (OUTPATIENT)
Dept: GASTROENTEROLOGY | Facility: CLINIC | Age: 52
End: 2022-05-13

## 2022-05-13 ENCOUNTER — TELEPHONE (OUTPATIENT)
Dept: ONCOLOGY | Facility: OTHER | Age: 52
End: 2022-05-13

## 2022-06-02 ENCOUNTER — HOSPITAL ENCOUNTER (OUTPATIENT)
Dept: CT IMAGING | Facility: HOSPITAL | Age: 52
Discharge: HOME | End: 2022-06-02
Admitting: NURSE PRACTITIONER

## 2022-06-02 DIAGNOSIS — Z00.00 ANNUAL PHYSICAL EXAM: ICD-10-CM

## 2022-06-02 DIAGNOSIS — F17.200 SMOKER: ICD-10-CM

## 2022-06-02 DIAGNOSIS — Z12.2 SCREENING FOR LUNG CANCER: ICD-10-CM

## 2022-06-02 PROCEDURE — 71271 CT THORAX LUNG CANCER SCR C-: CPT

## 2022-11-04 ENCOUNTER — HOSPITAL ENCOUNTER (EMERGENCY)
Facility: HOSPITAL | Age: 52
Discharge: HOME | End: 2022-11-04
Attending: EMERGENCY MEDICINE | Admitting: EMERGENCY MEDICINE

## 2022-11-04 VITALS
SYSTOLIC BLOOD PRESSURE: 152 MMHG | TEMPERATURE: 97.8 F | OXYGEN SATURATION: 99 % | BODY MASS INDEX: 30.2 KG/M2 | HEART RATE: 74 BPM | DIASTOLIC BLOOD PRESSURE: 93 MMHG | HEIGHT: 68 IN | WEIGHT: 199.3 LBS | RESPIRATION RATE: 18 BRPM
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
## 2022-11-04 DIAGNOSIS — S13.4XXA WHIPLASH INJURY TO NECK, INITIAL ENCOUNTER: ICD-10-CM

## 2022-11-04 PROCEDURE — 99282 EMERGENCY DEPT VISIT SF MDM: CPT

## 2022-11-04 PROCEDURE — 25010000002 KETOROLAC TROMETHAMINE PER 15 MG: Performed by: NURSE PRACTITIONER

## 2022-11-04 PROCEDURE — 96372 THER/PROPH/DIAG INJ SC/IM: CPT

## 2022-11-04 RX ORDER — KETOROLAC TROMETHAMINE 10 MG/1
10 TABLET, FILM COATED ORAL EVERY 6 HOURS PRN
Qty: 15 TABLET | Refills: 0 | Status: SHIPPED | OUTPATIENT
Start: 2022-11-04

## 2022-11-04 RX ORDER — KETOROLAC TROMETHAMINE 30 MG/ML
30 INJECTION, SOLUTION INTRAMUSCULAR; INTRAVENOUS ONCE
Status: COMPLETED | OUTPATIENT
Start: 2022-11-04 | End: 2022-11-04

## 2022-11-04 RX ORDER — CYCLOBENZAPRINE HCL 10 MG
10 TABLET ORAL 3 TIMES DAILY PRN
Qty: 15 TABLET | Refills: 0 | Status: SHIPPED | OUTPATIENT
Start: 2022-11-04

## 2022-11-04 RX ADMIN — KETOROLAC TROMETHAMINE 30 MG: 30 INJECTION, SOLUTION INTRAMUSCULAR; INTRAVENOUS at 19:46
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
## 2023-09-08 ENCOUNTER — HOSPITAL ENCOUNTER (OUTPATIENT)
Dept: CT IMAGING | Facility: HOSPITAL | Age: 53
Discharge: HOME | End: 2023-09-08
Payer: OTHER GOVERNMENT

## 2023-09-08 ENCOUNTER — OFFICE VISIT (OUTPATIENT)
Dept: FAMILY MEDICINE CLINIC | Facility: CLINIC | Age: 53
End: 2023-09-08
Payer: OTHER GOVERNMENT

## 2023-09-08 VITALS
BODY MASS INDEX: 30 KG/M2 | OXYGEN SATURATION: 97 % | SYSTOLIC BLOOD PRESSURE: 173 MMHG | HEART RATE: 95 BPM | HEIGHT: 68 IN | WEIGHT: 197.97 LBS | TEMPERATURE: 97.9 F | DIASTOLIC BLOOD PRESSURE: 86 MMHG | RESPIRATION RATE: 17 BRPM

## 2023-09-08 VITALS
HEART RATE: 88 BPM | TEMPERATURE: 98 F | BODY MASS INDEX: 30.28 KG/M2 | SYSTOLIC BLOOD PRESSURE: 124 MMHG | HEIGHT: 68 IN | OXYGEN SATURATION: 98 % | DIASTOLIC BLOOD PRESSURE: 68 MMHG | WEIGHT: 199.8 LBS

## 2023-09-08 DIAGNOSIS — R31.29 OTHER MICROSCOPIC HEMATURIA: ICD-10-CM

## 2023-09-08 DIAGNOSIS — R10.9 FLANK PAIN: ICD-10-CM

## 2023-09-08 DIAGNOSIS — E66.09 CLASS 1 OBESITY DUE TO EXCESS CALORIES WITH SERIOUS COMORBIDITY AND BODY MASS INDEX (BMI) OF 30.0 TO 30.9 IN ADULT: ICD-10-CM

## 2023-09-08 DIAGNOSIS — Z76.89 ENCOUNTER FOR SUPPORT AND COORDINATION OF TRANSITION OF CARE: ICD-10-CM

## 2023-09-08 DIAGNOSIS — Z12.2 SCREENING FOR LUNG CANCER: ICD-10-CM

## 2023-09-08 DIAGNOSIS — I10 PRIMARY HYPERTENSION: ICD-10-CM

## 2023-09-08 DIAGNOSIS — N20.0 RENAL CALCULI: Primary | ICD-10-CM

## 2023-09-08 DIAGNOSIS — Z00.00 ANNUAL PHYSICAL EXAM: Primary | ICD-10-CM

## 2023-09-08 DIAGNOSIS — Z23 NEED FOR INFLUENZA VACCINATION: ICD-10-CM

## 2023-09-08 DIAGNOSIS — Z23 NEED FOR TDAP VACCINATION: ICD-10-CM

## 2023-09-08 LAB
BILIRUB BLD-MCNC: ABNORMAL MG/DL
CLARITY, POC: CLEAR
COLOR UR: ABNORMAL
GLUCOSE UR STRIP-MCNC: NEGATIVE MG/DL
KETONES UR QL: ABNORMAL
LEUKOCYTE EST, POC: NEGATIVE
NITRITE UR-MCNC: NEGATIVE MG/ML
PH UR: 6 [PH] (ref 5–8)
PROT UR STRIP-MCNC: NEGATIVE MG/DL
RBC # UR STRIP: ABNORMAL /UL
SP GR UR: 1.02 (ref 1–1.03)
UROBILINOGEN UR QL: NORMAL

## 2023-09-08 PROCEDURE — 36415 COLL VENOUS BLD VENIPUNCTURE: CPT | Performed by: EMERGENCY MEDICINE

## 2023-09-08 PROCEDURE — 85025 COMPLETE CBC W/AUTO DIFF WBC: CPT | Performed by: EMERGENCY MEDICINE

## 2023-09-08 PROCEDURE — 83690 ASSAY OF LIPASE: CPT

## 2023-09-08 PROCEDURE — 80053 COMPREHEN METABOLIC PANEL: CPT

## 2023-09-08 PROCEDURE — 74176 CT ABD & PELVIS W/O CONTRAST: CPT

## 2023-09-08 PROCEDURE — 99283 EMERGENCY DEPT VISIT LOW MDM: CPT

## 2023-09-08 RX ORDER — PHENAZOPYRIDINE HYDROCHLORIDE 100 MG/1
200 TABLET, FILM COATED ORAL ONCE
Status: COMPLETED | OUTPATIENT
Start: 2023-09-09 | End: 2023-09-09

## 2023-09-08 RX ORDER — SODIUM CHLORIDE 0.9 % (FLUSH) 0.9 %
10 SYRINGE (ML) INJECTION AS NEEDED
Status: DISCONTINUED | OUTPATIENT
Start: 2023-09-08 | End: 2023-09-09 | Stop reason: HOSPADM

## 2023-09-08 RX ORDER — ONDANSETRON 2 MG/ML
4 INJECTION INTRAMUSCULAR; INTRAVENOUS ONCE
Status: COMPLETED | OUTPATIENT
Start: 2023-09-09 | End: 2023-09-09

## 2023-09-08 RX ORDER — TAMSULOSIN HYDROCHLORIDE 0.4 MG/1
0.4 CAPSULE ORAL ONCE
Status: COMPLETED | OUTPATIENT
Start: 2023-09-09 | End: 2023-09-09

## 2023-09-08 RX ORDER — KETOROLAC TROMETHAMINE 30 MG/ML
30 INJECTION, SOLUTION INTRAMUSCULAR; INTRAVENOUS ONCE
Status: COMPLETED | OUTPATIENT
Start: 2023-09-09 | End: 2023-09-09

## 2023-09-08 NOTE — PROGRESS NOTES
Chief Complaint  Chief Complaint   Patient presents with    [facility]    Annual Exam    Flank Pain     Left flank pain radiating to left side of lower abdomen       Subjective      Madi Rehman presents to [facility]  The patient presents today for an annual physical and transitional care from his previous PCP.     He has hypertension but denies any other significant past medical history. He is taking antihypertensive medication and [facility] also [facility] his [facility]. His [facility] work [facility] obtained by [facility] at each [facility] as well; [facility] in [facility]23. He was on a diuretic at one time but that was discontinued because his potassium was elevated as a result. He denies any edema in his feet or ankles or any dyspnea. He currently smokes and has smoked 1 pack of cigarettes per day for 35 to 36 years and has tried to quit with willpower but has been unsuccessful. Any smoking cessation medications that his previous provider, CHANTAL Patton, prescribed him, his insurance declined payment for. He had a lung cancer screening in 06/2022 and no nodules or masses were found.     He complains of new left lower flank pain that started last night that worsened and shifted to his abdomen. After laying down, the pain improved and he waited to be seen today for treatment. His pain resolved overnight and denies ever having renal calculi. He was having urinary retention but was able to void eventually with normal color and his pain eased. He denies any abdominal pain today.     His weight has been stable but he acknowledges that he is overweight. He confirms that he has received an influenza vaccine but is due for a Tdap vaccine. He is not taking any cholesterol medication and confirms that he only takes blood pressure and allergy medications.     Objective     Medical History:  Past Medical History:   Diagnosis Date    Allergic      "Hypertension      Past Surgical History:   Procedure Laterality Date    FOOT SURGERY Left     left 2nd toe removal      Social History     Tobacco Use    Smoking status: Every Day     Packs/day: 1.00     Years: 35.00     Pack years: 35.00     Types: Cigarettes     Start date: 1987    Smokeless tobacco: Never   Vaping Use    Vaping Use: Never used   Substance Use Topics    Alcohol use: Yes    Drug use: Defer     Family History   Problem Relation Age of Onset    Hypertension Mother     Hypertension Father        Medications:  Prior to Admission medications    Medication Sig Start Date End Date Taking? Authorizing Provider   albuterol sulfate  (90 Base) MCG/ACT inhaler Inhale 2 puffs Every 4 (Four) Hours As Needed for Wheezing or Shortness of Air (congestion/cough). 5/11/22  Yes Emily Moran APRN   cetirizine (zyrTEC) 10 MG tablet Take 1 tablet by mouth Daily. 5/11/22  Yes Emily Moran APRN   cyclobenzaprine (FLEXERIL) 10 MG tablet Take 1 tablet by mouth 3 (Three) Times a Day As Needed for Muscle Spasms. 11/4/22   Justino Partida APRN   ketorolac (TORADOL) 10 MG tablet Take 1 tablet by mouth Every 6 (Six) Hours As Needed for Moderate Pain. 11/4/22   Justino Partida APRN        Allergies:   Patient has no known allergies.    Health Maintenance Due   Topic Date Due    TDAP/TD VACCINES (1 - Tdap) Never done    COVID-19 Vaccine (3 - Booster for Luis Manuel series) 01/10/2022    ANNUAL PHYSICAL  05/11/2023    BMI FOLLOWUP  05/11/2023    LUNG CANCER SCREENING  06/02/2023         Vital Signs:   /68 (BP Location: Left arm, Patient Position: Sitting, Cuff Size: Adult)   Pulse 88   Temp 98 °F (36.7 °C) (Oral)   Ht 172.7 cm (68\")   Wt 90.6 kg (199 lb 12.8 oz)   SpO2 98%   BMI 30.38 kg/m²     Wt Readings from Last 3 Encounters:   09/08/23 90.6 kg (199 lb 12.8 oz)   11/04/22 90.4 kg (199 lb 4.7 oz)   05/11/22 90.7 kg (200 lb)     BP Readings from Last 3 Encounters:   09/08/23 124/68 "   11/04/22 152/93   05/11/22 148/78       BMI is >= 30 and <35. (Class 1 Obesity). The following options were offered after discussion;: exercise counseling/recommendations and nutrition counseling/recommendations       Physical Exam  Vitals reviewed.   Constitutional:       Appearance: Normal appearance. He is well-developed. He is obese.   HENT:      Head: Normocephalic and atraumatic.   Eyes:      Conjunctiva/sclera: Conjunctivae normal.      Pupils: Pupils are equal, round, and reactive to light.   Cardiovascular:      Rate and Rhythm: Normal rate and regular rhythm.      Heart sounds: No murmur heard.    No friction rub. No gallop.   Pulmonary:      Effort: Pulmonary effort is normal.      Breath sounds: Normal breath sounds. No wheezing or rhonchi.   Abdominal:      General: Bowel sounds are normal. There is no distension.      Palpations: Abdomen is soft.      Tenderness: There is no abdominal tenderness.   Skin:     General: Skin is warm and dry.   Neurological:      Mental Status: He is alert and oriented to person, place, and time.      Cranial Nerves: No cranial nerve deficit.   Psychiatric:         Mood and Affect: Mood and affect normal.         Behavior: Behavior normal.         Thought Content: Thought content normal.         Judgment: Judgment normal.       Result Review :    The following data was reviewed by CHANTAL Chaves on 09/08/23 at 08:07 EDT:    Lab Results   Component Value Date    COLORU [place] 09/08/2023    CLARITYU Clear 09/08/2023    PHUR 6.0 09/08/2023    KETONESU Trace (A) 09/08/2023    BILIRUBINUR Small (1+) (A) 09/08/2023    LEUKOCYTESUR Negative 09/08/2023    UROBILINOGEN Normal 09/08/2023       No Images in the past 120 days found..               Assessment and Plan    Diagnoses and all orders for this visit:    1. Annual physical exam (Primary)    2. Encounter for support and coordination of transition of care    3. Primary hypertension    4. Flank pain  -     POCT  urinalysis dipstick, manual    5. Need for Tdap vaccination  -     Tdap Vaccine => 6yo IM (BOOSTRIX)    6. Class 1 obesity due to excess calories with serious comorbidity and body mass index (BMI) of 30.0 to 30.9 in adult    7. Screening for lung cancer  -      CT Chest Low Dose Cancer Screening WO; Future       Hypertension  His blood pressure is 124/68 mmHg today and well controlled with his current medication regimen.     Left flank pain  Hematuria was found in his urinalysis today. A STAT CT scan will be ordered to rule out renal calculi.     Obesity  He is encouraged to increase his physical activity to 150 minutes of exercise per week and decrease his sodium, glucose, and carbohydrate intake.     Preventative care  He will receive his Tdap vaccine today.        Smoking Cessation:    Madi Rehman  reports that he has been smoking cigarettes. He started smoking about 36 years ago. He has a 35.00 pack-year smoking history. He has never used smokeless tobacco.. I have educated him on the risk of diseases from using tobacco products such as cancer, COPD, and heart disease.     I advised him to quit and he is not willing to quit.    I spent 3  minutes counseling the patient.            Follow Up   Return in about 6 months (around 3/8/2024) for Next scheduled follow up.  Patient was given instructions and counseling regarding his condition or for health maintenance advice. Please see specific information pulled into the AVS if appropriate.     Please note that portions of this note were completed with a voice recognition program.    Transcribed from ambient dictation for CHANTAL Chaves by Marti Macias.  09/08/23   09:10 EDT    Patient or patient representative verbalized consent to the visit recording.  I have personally performed the services described in this document as transcribed by the above individual, and it is both accurate and complete.

## 2023-09-09 ENCOUNTER — HOSPITAL ENCOUNTER (EMERGENCY)
Facility: HOSPITAL | Age: 53
Discharge: HOME | End: 2023-09-09
Attending: EMERGENCY MEDICINE
Payer: OTHER GOVERNMENT

## 2023-09-09 DIAGNOSIS — N23 RENAL COLIC: Primary | ICD-10-CM

## 2023-09-09 DIAGNOSIS — N20.0 KIDNEY STONE ON LEFT SIDE: ICD-10-CM

## 2023-09-09 LAB
ALBUMIN SERPL-MCNC: 4.6 G/DL (ref 3.5–5.2)
ALBUMIN/GLOB SERPL: 1.4 G/DL
ALP SERPL-CCNC: 89 U/L (ref 39–117)
ALT SERPL W P-5'-P-CCNC: 31 U/L (ref 1–41)
ANION GAP SERPL CALCULATED.3IONS-SCNC: 12.9 MMOL/L (ref 5–15)
AST SERPL-CCNC: 16 U/L (ref 1–40)
BACTERIA UR QL AUTO: ABNORMAL /HPF
BASOPHILS # BLD AUTO: 0.08 10*3/MM3 (ref 0–0.2)
BASOPHILS NFR BLD AUTO: 0.5 % (ref 0–1.5)
BILIRUB SERPL-MCNC: 0.4 MG/DL (ref 0–1.2)
BILIRUB UR QL STRIP: NEGATIVE
BUN SERPL-MCNC: 12 MG/DL (ref 6–20)
BUN/CREAT SERPL: 12.5 (ref 7–25)
CALCIUM SPEC-SCNC: 9.7 MG/DL (ref 8.6–10.5)
CHLORIDE SERPL-SCNC: 106 MMOL/L (ref 98–107)
CLARITY UR: CLEAR
CO2 SERPL-SCNC: 20.1 MMOL/L (ref 22–29)
COLOR UR: YELLOW
CREAT SERPL-MCNC: 0.96 MG/DL (ref 0.76–1.27)
DEPRECATED RDW RBC AUTO: 46.7 FL (ref 37–54)
EGFRCR SERPLBLD CKD-EPI 2021: 94.5 ML/MIN/1.73
EOSINOPHIL # BLD AUTO: 0.16 10*3/MM3 (ref 0–0.4)
EOSINOPHIL NFR BLD AUTO: 1 % (ref 0.3–6.2)
ERYTHROCYTE [DISTWIDTH] IN BLOOD BY AUTOMATED COUNT: 13.4 % (ref 12.3–15.4)
GLOBULIN UR ELPH-MCNC: 3.3 GM/DL
GLUCOSE SERPL-MCNC: 143 MG/DL (ref 65–99)
GLUCOSE UR STRIP-MCNC: NEGATIVE MG/DL
HCT VFR BLD AUTO: 46.9 % (ref 37.5–51)
HGB BLD-MCNC: 16.5 G/DL (ref 13–17.7)
HGB UR QL STRIP.AUTO: ABNORMAL
HOLD SPECIMEN: NORMAL
HOLD SPECIMEN: NORMAL
HYALINE CASTS UR QL AUTO: ABNORMAL /LPF
IMM GRANULOCYTES # BLD AUTO: 0.08 10*3/MM3 (ref 0–0.05)
IMM GRANULOCYTES NFR BLD AUTO: 0.5 % (ref 0–0.5)
KETONES UR QL STRIP: ABNORMAL
LEUKOCYTE ESTERASE UR QL STRIP.AUTO: NEGATIVE
LIPASE SERPL-CCNC: 22 U/L (ref 13–60)
LYMPHOCYTES # BLD AUTO: 2.55 10*3/MM3 (ref 0.7–3.1)
LYMPHOCYTES NFR BLD AUTO: 15.7 % (ref 19.6–45.3)
MCH RBC QN AUTO: 33.3 PG (ref 26.6–33)
MCHC RBC AUTO-ENTMCNC: 35.2 G/DL (ref 31.5–35.7)
MCV RBC AUTO: 94.6 FL (ref 79–97)
MONOCYTES # BLD AUTO: 1.13 10*3/MM3 (ref 0.1–0.9)
MONOCYTES NFR BLD AUTO: 7 % (ref 5–12)
NEUTROPHILS NFR BLD AUTO: 12.21 10*3/MM3 (ref 1.7–7)
NEUTROPHILS NFR BLD AUTO: 75.3 % (ref 42.7–76)
NITRITE UR QL STRIP: NEGATIVE
NRBC BLD AUTO-RTO: 0 /100 WBC (ref 0–0.2)
PH UR STRIP.AUTO: 5.5 [PH] (ref 5–8)
PLATELET # BLD AUTO: 268 10*3/MM3 (ref 140–450)
PMV BLD AUTO: 9.4 FL (ref 6–12)
POTASSIUM SERPL-SCNC: 4 MMOL/L (ref 3.5–5.2)
PROT SERPL-MCNC: 7.9 G/DL (ref 6–8.5)
PROT UR QL STRIP: ABNORMAL
RBC # BLD AUTO: 4.96 10*6/MM3 (ref 4.14–5.8)
RBC # UR STRIP: ABNORMAL /HPF
REF LAB TEST METHOD: ABNORMAL
SODIUM SERPL-SCNC: 139 MMOL/L (ref 136–145)
SP GR UR STRIP: 1.03 (ref 1–1.03)
SQUAMOUS #/AREA URNS HPF: ABNORMAL /HPF
UROBILINOGEN UR QL STRIP: ABNORMAL
WBC # UR STRIP: ABNORMAL /HPF
WBC NRBC COR # BLD: 16.21 10*3/MM3 (ref 3.4–10.8)
WHOLE BLOOD HOLD COAG: NORMAL
WHOLE BLOOD HOLD SPECIMEN: NORMAL

## 2023-09-09 PROCEDURE — 81001 URINALYSIS AUTO W/SCOPE: CPT

## 2023-09-09 PROCEDURE — 25010000002 ONDANSETRON PER 1 MG: Performed by: NURSE PRACTITIONER

## 2023-09-09 PROCEDURE — 25010000002 KETOROLAC TROMETHAMINE PER 15 MG: Performed by: NURSE PRACTITIONER

## 2023-09-09 PROCEDURE — 96374 THER/PROPH/DIAG INJ IV PUSH: CPT

## 2023-09-09 PROCEDURE — 96375 TX/PRO/DX INJ NEW DRUG ADDON: CPT

## 2023-09-09 RX ORDER — ONDANSETRON 4 MG/1
4 TABLET, ORALLY DISINTEGRATING ORAL EVERY 4 HOURS PRN
Qty: 15 TABLET | Refills: 0 | Status: ON HOLD | OUTPATIENT
Start: 2023-09-09 | End: 2023-09-15 | Stop reason: SDUPTHER

## 2023-09-09 RX ORDER — OXYCODONE HYDROCHLORIDE AND ACETAMINOPHEN 5; 325 MG/1; MG/1
1-2 TABLET ORAL EVERY 6 HOURS PRN
Qty: 15 TABLET | Refills: 0 | Status: ON HOLD | OUTPATIENT
Start: 2023-09-09 | End: 2023-09-15 | Stop reason: SDUPTHER

## 2023-09-09 RX ORDER — TAMSULOSIN HYDROCHLORIDE 0.4 MG/1
1 CAPSULE ORAL DAILY
Qty: 12 CAPSULE | Refills: 0 | Status: SHIPPED | OUTPATIENT
Start: 2023-09-09

## 2023-09-09 RX ORDER — KETOROLAC TROMETHAMINE 10 MG/1
10 TABLET, FILM COATED ORAL EVERY 6 HOURS PRN
Qty: 10 TABLET | Refills: 0 | Status: ON HOLD | OUTPATIENT
Start: 2023-09-09 | End: 2023-09-15 | Stop reason: SDUPTHER

## 2023-09-09 RX ADMIN — KETOROLAC TROMETHAMINE 30 MG: 30 INJECTION, SOLUTION INTRAMUSCULAR; INTRAVENOUS at 03:20

## 2023-09-09 RX ADMIN — TAMSULOSIN HYDROCHLORIDE 0.4 MG: 0.4 CAPSULE ORAL at 03:18

## 2023-09-09 RX ADMIN — ONDANSETRON 4 MG: 2 INJECTION INTRAMUSCULAR; INTRAVENOUS at 03:20

## 2023-09-09 RX ADMIN — SODIUM CHLORIDE 1000 ML: 9 INJECTION, SOLUTION INTRAVENOUS at 03:20

## 2023-09-09 RX ADMIN — PHENAZOPYRIDINE 200 MG: 100 TABLET ORAL at 03:18

## 2023-09-09 NOTE — DISCHARGE INSTRUCTIONS
Take medications as prescribed.    Drink plenty fluids.    Follow-up with urology.    return for new or worsening symptoms including uncontrolled pain, inability to tolerate anything by mouth, or fever

## 2023-09-09 NOTE — ED PROVIDER NOTES
Time: 11:58 PM EDT  Date of encounter:  9/8/2023  Independent Historian/Clinical History and Information was obtained by:   Patient    History is limited by: N/A    Chief Complaint: Flank pain      History of Present Illness:  Patient is a 52 y.o. year old male who presents to the emergency department for evaluation of pain due to a kidney stone that was diagnosed this morning on outpatient CT scan.  He complains of left abdominal pain radiating through to his back, nausea, and vomiting since Thursday.  The stone was 7 mm and was mid-ureter with mild hydronephrosis.  He has not had a fever.  Patient has vomited numerous times.  No diarrhea.  No dysuria.  No fever.  Patient has tried Tylenol over-the-counter without any relief.  No testicular pain or swelling    HPI    Patient Care Team  Primary Care Provider: [place] APRN    Past Medical History:     No Known Allergies  Past Medical History:   Diagnosis Date    Allergic     Hypertension      Past Surgical History:   Procedure Laterality Date    FOOT SURGERY Left     left 2nd toe removal     Family History   Problem Relation Age of Onset    Hypertension Mother     Hypertension Father        Home Medications:  Prior to Admission medications    Medication Sig Start Date End Date Taking? Authorizing Provider   albuterol sulfate  (90 Base) MCG/ACT inhaler Inhale 2 puffs Every 4 (Four) Hours As Needed for Wheezing or Shortness of Air (congestion/cough). 5/11/22   Emily Moran APRN   cetirizine (zyrTEC) 10 MG tablet Take 1 tablet by mouth Daily. 5/11/22 9/8/23  Emily Moran APRN   cyclobenzaprine (FLEXERIL) 10 MG tablet Take 1 tablet by mouth 3 (Three) Times a Day As Needed for Muscle Spasms. 11/4/22 9/8/23  Justino Partida APRN   ketorolac (TORADOL) 10 MG tablet Take 1 tablet by mouth Every 6 (Six) Hours As Needed for Moderate Pain. 11/4/22 9/8/23  Justino Partida APRN        Social History:   Social History  "    Tobacco Use    Smoking status: Every Day     Packs/day: 1.00     Years: 35.00     Pack years: 35.00     Types: Cigarettes     Start date: 1987    Smokeless tobacco: Never   Vaping Use    Vaping Use: Never used   Substance Use Topics    Alcohol use: Yes    Drug use: Defer         Review of Systems:  Review of Systems   Constitutional:  Negative for chills and fever.   HENT:  Negative for congestion, ear pain and sore throat.    Eyes:  Negative for pain.   Respiratory:  Negative for cough, chest tightness and shortness of breath.    Cardiovascular:  Negative for chest pain.   Gastrointestinal:  Positive for abdominal pain, nausea and vomiting. Negative for diarrhea.   Genitourinary:  Positive for flank pain. Negative for difficulty urinating, dysuria and hematuria.   Musculoskeletal:  Positive for back pain. Negative for joint swelling.   Skin:  Negative for pallor.   Neurological:  Negative for seizures and headaches.   Hematological: Negative.    Psychiatric/Behavioral: Negative.     All other systems reviewed and are negative.     Physical Exam:  /86   Pulse 95   Temp 97.9 °F (36.6 °C) (Oral)   Resp 17   Ht 172.7 cm (68\")   Wt 89.8 kg (197 lb 15.6 oz)   SpO2 97%   BMI 30.10 kg/m²     Physical Exam  Vitals and nursing note reviewed.   Constitutional:       General: He is in acute distress (Appears uncomfortable).      Appearance: Normal appearance. He is not toxic-appearing.   HENT:      Head: Normocephalic and atraumatic.      Nose: Nose normal.      Mouth/Throat:      Mouth: Mucous membranes are moist.   Eyes:      General: No scleral icterus.     Conjunctiva/sclera: Conjunctivae normal.   Cardiovascular:      Rate and Rhythm: Normal rate and regular rhythm.      Heart sounds: Normal heart sounds.   Pulmonary:      Effort: Pulmonary effort is normal. No respiratory distress.      Breath sounds: Normal breath sounds.   Abdominal:      General: Bowel sounds are normal.      Palpations: Abdomen is " soft.      Tenderness: There is abdominal tenderness (Left lower and left lateral). There is left CVA tenderness.   Musculoskeletal:         General: Normal range of motion.      Cervical back: Normal range of motion and neck supple.   Skin:     General: Skin is warm and dry.   Neurological:      Mental Status: He is alert and oriented to person, place, and time. Mental status is at baseline.   Psychiatric:         Mood and Affect: Mood normal.         Behavior: Behavior normal.              Medical Decision Making:      Comorbidities that affect care:    Hypertension, Smoking    External Notes reviewed:    Previous Clinic Note: Patient seen by PCP yesterday for physical exam.  Hypertension and obesity were addressed as well as a outpatient CT was ordered for flank pain and Previous Radiological Studies: Outpatient CT reviewed from yesterday 7 mm left ureteral obstructive stone      The following orders were placed and all results were independently analyzed by me:  Orders Placed This Encounter   Procedures    [facility] Draw    Comprehensive Metabolic Panel    Lipase    Urinalysis With Microscopic If Indicated (No Culture) - Urine, Clean Catch    Lactic Acid, Plasma    CBC Auto Differential    Urinalysis, Microscopic Only - Urine, Clean Catch    Ambulatory Referral to Urology    NPO Diet NPO Type: Strict NPO    Undress & Gown    Insert Peripheral IV    CBC & Differential    Green Top (Gel)    Lavender Top    Gold Top - SST    Light Blue Top       Medications Given in the Emergency Department:  Medications   sodium chloride 0.9 % flush 10 mL (has no administration in time range)   ketorolac (TORADOL) injection 30 mg (30 mg Intravenous Given 9/9/23 0320)   ondansetron (ZOFRAN) injection 4 mg (4 mg Intravenous Given 9/9/23 0320)   sodium chloride 0.9 % bolus 1,000 mL (0 mL Intravenous Stopped 9/9/23 0423)   tamsulosin (FLOMAX) 24 hr capsule 0.4 mg (0.4 mg Oral Given 9/9/23 0318)   phenazopyridine (PYRIDIUM) tablet 200 mg  (200 mg Oral Given 9/9/23 0318)        ED Course:    ED Course as of 09/09/23 0432   Sat Sep 09, 2023   0423 Patient reports resolved pain after medication [DS]      ED Course User Index  [DS] Jayde Guerrero APRN       Labs:    Lab Results (last 24 hours)       Procedure Component Value Units Date/Time    POCT urinalysis dipstick, manual [[id number]]  (Abnormal) Collected: 09/08/23 0814    Specimen: Urine Updated: 09/08/23 0815     Color Ana Rosa     Clarity, UA Clear     Glucose, UA Negative mg/dL      Bilirubin Small (1+)     Ketones, UA Trace     Specific Gravity  1.025     Blood, UA Moderate     pH, Urine 6.0     Protein, POC Negative mg/dL      Urobilinogen, UA Normal     Leukocytes Negative     Nitrite, UA Negative    CBC & Differential [[id number]]  (Abnormal) Collected: 09/08/23 2354    Specimen: Blood Updated: 09/09/23 0008    Narrative:      The following orders were created for panel order CBC & Differential.  Procedure                               Abnormality         Status                     ---------                               -----------         ------                     CBC Auto Differential[[id number]]        Abnormal            Final result                 Please view results for these tests on the individual orders.    Comprehensive Metabolic Panel [[id number]]  (Abnormal) Collected: 09/08/23 2354    Specimen: Blood Updated: 09/09/23 0037     Glucose 143 mg/dL      BUN 12 mg/dL      Creatinine 0.96 mg/dL      Sodium 139 mmol/L      Potassium 4.0 mmol/L      Comment: Slight hemolysis detected by analyzer. Results may be affected.        Chloride 106 mmol/L      CO2 20.1 mmol/L      Calcium 9.7 mg/dL      Total Protein 7.9 g/dL      Albumin 4.6 g/dL      ALT (SGPT) 31 U/L      AST (SGOT) 16 U/L      Alkaline Phosphatase 89 U/L      Total Bilirubin 0.4 mg/dL      Globulin 3.3 gm/dL      A/G Ratio 1.4 g/dL      BUN/Creatinine Ratio 12.5     Anion Gap 12.9 mmol/L      eGFR 94.5 mL/min/1.73     Narrative:       GFR Normal >60  Chronic Kidney Disease <60  Kidney Failure <15      Lipase [[id number]]  (Normal) Collected: 09/08/23 2354    Specimen: Blood Updated: 09/09/23 0037     Lipase 22 U/L     CBC Auto Differential [[id number]]  (Abnormal) Collected: 09/08/23 2354    Specimen: Blood Updated: 09/09/23 0008     WBC 16.21 10*3/mm3      RBC 4.96 10*6/mm3      Hemoglobin 16.5 g/dL      Hematocrit 46.9 %      MCV 94.6 fL      MCH 33.3 pg      MCHC 35.2 g/dL      RDW 13.4 %      RDW-SD 46.7 fl      MPV 9.4 fL      Platelets 268 10*3/mm3      Neutrophil % 75.3 %      Lymphocyte % 15.7 %      Monocyte % 7.0 %      Eosinophil % 1.0 %      Basophil % 0.5 %      Immature Grans % 0.5 %      Neutrophils, Absolute 12.21 10*3/mm3      Lymphocytes, Absolute 2.55 10*3/mm3      Monocytes, Absolute 1.13 10*3/mm3      Eosinophils, Absolute 0.16 10*3/mm3      Basophils, Absolute 0.08 10*3/mm3      Immature Grans, Absolute 0.08 10*3/mm3      nRBC 0.0 /100 WBC     Urinalysis With Microscopic If Indicated (No Culture) - Urine, Clean Catch [[id number]]  (Abnormal) Collected: 09/09/23 0011    Specimen: Urine, Clean Catch Updated: 09/09/23 0028     Color, UA Yellow     Appearance, UA Clear     pH, UA 5.5     Specific Gravity, UA 1.027     Glucose, UA Negative     Ketones, UA Trace     Bilirubin, UA Negative     Blood, UA Moderate (2+)     Protein, UA Trace     Leuk Esterase, UA Negative     Nitrite, UA Negative     Urobilinogen, UA 1.0 E.U./dL    Urinalysis, Microscopic Only - Urine, Clean Catch [[id number]]  (Abnormal) Collected: 09/09/23 0011    Specimen: Urine, Clean Catch Updated: 09/09/23 0028     RBC, UA 13-20 /HPF      WBC, UA 0-2 /HPF      Bacteria, UA None Seen /HPF      Squamous Epithelial Cells, UA 0-2 /HPF      Hyaline Casts, UA 0-2 /LPF      Methodology Automated Microscopy             Imaging:    CT Abdomen Pelvis Without Contrast    Result Date: 9/8/2023  PROCEDURE: CT ABDOMEN PELVIS WO CONTRAST  COMPARISON: [facility],  CT, CT CHEST LOW DOSE CANCER SCREENING WO, 6/02/2022, 8:18.  INDICATIONS: LEFT ABDOMINAL PAIN INFERIOR TO RIB CAGE X 1 DAY flank pain, hematuria  TECHNIQUE: CT images were created without intravenous contrast.   PROTOCOL:   Standard imaging protocol performed    RADIATION:   DLP: 413 mGy*cm   Automated exposure control was utilized to minimize radiation dose.  FINDINGS:  Subsegmental atelectasis is seen at the left lung base.  The liver is of normal size.  The liver is of diffusely diminished density consistent with mild fatty infiltration.  The gallbladder is contracted.  No pancreatic or adrenal mass is evident.  The spleen is of normal size.  7 mm stone is seen in the mid left ureter at the L3-4 level with mild left hydronephrosis.  The urinary bladder is not abnormally distended.  The prostate gland measures 4.9 cm in greatest transverse dimension.  Bowel loops are not abnormally dilated.  The appendix is normal.  No diverticula are evident.  Small umbilical hernia and left inguinal hernia containing fat are evident.  Mild degenerative changes are seen in the lower thoracic and lumbar spine.  CONTINUED ON NEXT PAGE...          CT scan of the abdomen and pelvis without contrast demonstrating subsegmental atelectasis at the left lung base.  Fatty liver.  7 mm stone in the mid left ureter at the L3-4 level with mild left hydronephrosis.     TANYA BARGER MD       Electronically Signed and Approved By: TANYA BARGER MD on 9/08/2023 at 18:11                Differential Diagnosis and Discussion:    Abdominal Pain: Based on the patient's signs and symptoms, I considered abdominal aortic aneurysm, small bowel obstruction, pancreatitis, acute cholecystitis, acute appendecitis, peptic ulcer disease, gastritis, colitis, endocrine disorders, irritable bowel syndrome and other differential diagnosis an etiology of the patient's abdominal pain.  Flank Pain: Differential diagnosis includes but is not limited to kidney  stones, pyelonephritis, musculoskeletal disorders, renal infarction, urinary tract infection, hydronephrosis, radiculopathy, aortic aneurysm, renal cell carcinoma.    All labs were reviewed and interpreted by me.    MDM  Number of Diagnoses or Management Options  Kidney stone on left side  Renal colic  Diagnosis management comments: The patient presents with flank pain. Patient was found to have ureterolithiasis on CT. The patient´s labs and urinalysis were reviewed. Patient is now resting comfortably, feels better, is alert, and is in no distress. The repeat examination is unremarkable and benign. The patient has no signs of urosepsis. The patient is referred to the on-call urologist for follow up and is discharged with oral medication for pain control and Flomax. The patient was counseled to return to the ER for fever >100.5, intractable pain or vomiting, or any other concerns that the may have. The patient has expressed a clear and thorough understanding and agreed to follow up as instructed.         Amount and/or Complexity of Data Reviewed  Clinical lab tests: reviewed and ordered  Tests in the radiology section of CPT®: reviewed  Tests in the medicine section of CPT®: ordered and reviewed    Risk of Complications, Morbidity, and/or Mortality  Presenting problems: moderate  Diagnostic procedures: low  Management options: low    Patient Progress  Patient progress: stable         Patient Care Considerations:    CT ABDOMEN AND PELVIS: I considered ordering a CT scan of the abdomen and pelvis however patient had an outpatient CT yesterday that showed 7 mm left mid ureteral stone      Consultants/Shared Management Plan:    None    Social Determinants of Health:    Patient is independent, reliable, and has access to care.       Disposition and Care Coordination:    Discharged: I considered escalation of care by admitting this patient for observation, however the patient has improved and is suitable and  stable for  discharge.    I have explained the patient´s condition, diagnoses and treatment plan based on the information available to me at this time. I have answered questions and addressed any concerns. The patient has a good  understanding of the patient´s diagnosis, condition, and treatment plan as can be expected at this point. The vital signs have been stable. The patient´s condition is stable and appropriate for discharge from the emergency department.      The patient will pursue further outpatient evaluation with the primary care physician or other designated or consulting physician as outlined in the discharge instructions. They are agreeable to this plan of care and follow-up instructions have been explained in detail. The patient has received these instructions in written format and have expressed an understanding of the discharge instructions. The patient is aware that any significant change in condition or worsening of symptoms should prompt an immediate return to this or the closest emergency department or call to 911.    Final diagnoses:   Renal colic   Kidney stone on left side        ED Disposition       ED Disposition   Discharge    Condition   Stable    Comment   --               This medical record created using voice recognition software.             Jayde Guerrero APRN  09/09/23 0436

## 2023-09-11 ENCOUNTER — TELEPHONE (OUTPATIENT)
Dept: UROLOGY | Facility: CLINIC | Age: 53
End: 2023-09-11
Payer: COMMERCIAL

## 2023-09-11 NOTE — TELEPHONE ENCOUNTER
Reviewed CT scan imaging.  Patient has a 7 mm mid ureteral stone (is already worked its way custodial down) the [employer] states that the patient can be observed to try to pass the stone up to 10 mm.  As a stone gets bigger, the statistics of spontaneously being able to pass it without a surgery goes down but it is possible that he could pass it depending on his symptoms.    Okay to put him in to go over his imaging and plan this week.  Thanks

## 2023-09-11 NOTE — TELEPHONE ENCOUNTER
PATIENT WAS SEEN AT Located within [facility] ER ON 09/09/23 FOR RENAL COLIC AND KIDNEY STONE ON THE LEFT SIDE.    HE SAID HIS PAIN HAS NOT BEEN BAD, SINCE HE GOT THE MEDICATION.  NO FEVER, CHILLS, NAUSEA, OR VOMITING.  NO ISSUE URINATING, AND HE DOESN'T SEE BLOOD IN HIS URINE.    HE WAS TOLD TO F/U WITH DR. KAN.

## 2023-09-12 ENCOUNTER — OFFICE VISIT (OUTPATIENT)
Dept: UROLOGY | Facility: CLINIC | Age: 53
End: 2023-09-12
Payer: OTHER GOVERNMENT

## 2023-09-12 ENCOUNTER — PREP FOR SURGERY (OUTPATIENT)
Dept: OTHER | Facility: HOSPITAL | Age: 53
End: 2023-09-12
Payer: COMMERCIAL

## 2023-09-12 VITALS — BODY MASS INDEX: 29.86 KG/M2 | HEIGHT: 68 IN | WEIGHT: 197 LBS | RESPIRATION RATE: 16 BRPM

## 2023-09-12 DIAGNOSIS — N20.1 LEFT URETERAL STONE: Primary | ICD-10-CM

## 2023-09-12 RX ORDER — CETIRIZINE HYDROCHLORIDE 10 MG/1
10 TABLET ORAL DAILY
COMMUNITY

## 2023-09-12 RX ORDER — LEVOFLOXACIN 5 MG/ML
500 INJECTION, SOLUTION INTRAVENOUS ONCE
Status: CANCELLED | OUTPATIENT
Start: 2023-09-12 | End: 2023-09-12

## 2023-09-12 NOTE — PROGRESS NOTES
UROLOGY OFFICE H&P NOTE    Subjective   HPI  Madi Rehman is a 52 y.o. male.  Presents for evaluation of left ureteral stone.    The patient was seen by his primary care provider on 09/08/2023, who ordered a CT scan. The pain has progressively worsened since that time.   The patient denies a history of kidney stones.   The pain medication has been helping; however, he has expanded it a couple of hours because it is running low. In the first night, the pain started in his back and went around and it was excruciating. The next morning, he woke up with a doctor's appointment and nothing was found. Last night, he felt like he had a stomachache.    Notes nausea, no emesis.  Denies fever.  Denies dysuria.    _______  CT abdomen pelvis without contrast 9/8/2023: 7 mm stone in the mid left ureter at the L3-4 level with mild left hydronephrosis     Results for orders placed or performed during the hospital encounter of 09/09/23   Comprehensive Metabolic Panel    Specimen: Blood   Result Value Ref Range    Glucose 143 (H) 65 - 99 mg/dL    BUN 12 6 - 20 mg/dL    Creatinine 0.96 0.76 - 1.27 mg/dL    Sodium 139 136 - 145 mmol/L    Potassium 4.0 3.5 - 5.2 mmol/L    Chloride 106 98 - 107 mmol/L    CO2 20.1 (L) 22.0 - 29.0 mmol/L    Calcium 9.7 8.6 - 10.5 mg/dL    Total Protein 7.9 6.0 - 8.5 g/dL    Albumin 4.6 3.5 - 5.2 g/dL    ALT (SGPT) 31 1 - 41 U/L    AST (SGOT) 16 1 - 40 U/L    Alkaline Phosphatase 89 39 - 117 U/L    Total Bilirubin 0.4 0.0 - 1.2 mg/dL    Globulin 3.3 gm/dL    A/G Ratio 1.4 g/dL    BUN/Creatinine Ratio 12.5 7.0 - 25.0    Anion Gap 12.9 5.0 - 15.0 mmol/L    eGFR 94.5 >60.0 mL/min/1.73   Lipase    Specimen: Blood   Result Value Ref Range    Lipase 22 13 - 60 U/L   Urinalysis With Microscopic If Indicated (No Culture) - Urine, Clean Catch    Specimen: Urine, Clean Catch   Result Value Ref Range    Color, UA Yellow Yellow, Straw    Appearance, UA Clear Clear    pH, UA 5.5 5.0 - 8.0    Specific Gravity, UA  1.027 1.005 - 1.030    Glucose, UA Negative Negative    Ketones, UA Trace (A) Negative    Bilirubin, UA Negative Negative    Blood, UA Moderate (2+) (A) Negative    Protein, UA Trace (A) Negative    Leuk Esterase, UA Negative Negative    Nitrite, UA Negative Negative    Urobilinogen, UA 1.0 E.U./dL 0.2 - 1.0 E.U./dL   CBC Auto Differential    Specimen: Blood   Result Value Ref Range    WBC 16.21 (H) 3.40 - 10.80 10*3/mm3    RBC 4.96 4.14 - 5.80 10*6/mm3    Hemoglobin 16.5 13.0 - 17.7 g/dL    Hematocrit 46.9 37.5 - 51.0 %    MCV 94.6 79.0 - 97.0 fL    MCH 33.3 (H) 26.6 - 33.0 pg    MCHC 35.2 31.5 - 35.7 g/dL    RDW 13.4 12.3 - 15.4 %    RDW-SD 46.7 37.0 - 54.0 fl    MPV 9.4 6.0 - 12.0 fL    Platelets 268 140 - 450 10*3/mm3    Neutrophil % 75.3 42.7 - 76.0 %    Lymphocyte % 15.7 (L) 19.6 - 45.3 %    Monocyte % 7.0 5.0 - 12.0 %    Eosinophil % 1.0 0.3 - 6.2 %    Basophil % 0.5 0.0 - 1.5 %    Immature Grans % 0.5 0.0 - 0.5 %    Neutrophils, Absolute 12.21 (H) 1.70 - 7.00 10*3/mm3    Lymphocytes, Absolute 2.55 0.70 - 3.10 10*3/mm3    Monocytes, Absolute 1.13 (H) 0.10 - 0.90 10*3/mm3    Eosinophils, Absolute 0.16 0.00 - 0.40 10*3/mm3    Basophils, Absolute 0.08 0.00 - 0.20 10*3/mm3    Immature Grans, Absolute 0.08 (H) 0.00 - 0.05 10*3/mm3    nRBC 0.0 0.0 - 0.2 /100 WBC   Urinalysis, Microscopic Only - Urine, Clean Catch    Specimen: Urine, Clean Catch   Result Value Ref Range    RBC, UA 13-20 (A) None Seen /HPF    WBC, UA 0-2 (A) None Seen /HPF    Bacteria, UA None Seen None Seen /HPF    Squamous Epithelial Cells, UA 0-2 None Seen, 0-2 /HPF    Hyaline Casts, UA 0-2 None Seen /LPF    Methodology Automated Microscopy    Green Top (Gel)   Result Value Ref Range    Extra Tube Hold for add-ons.    Lavender Top   Result Value Ref Range    Extra Tube hold for add-on    Gold Top - SST   Result Value Ref Range    Extra Tube Hold for add-ons.    Light Blue Top   Result Value Ref Range    Extra Tube Hold for add-ons.   "        Medical History:  Past Medical History:   Diagnosis Date    Allergic     Hypertension         Social History:  Social History     Socioeconomic History    Marital status:    Tobacco Use    Smoking status: Every Day     Packs/day: 1.00     Years: 35.00     Pack years: 35.00     Types: Cigarettes     Start date: 1987    Smokeless tobacco: Never   Vaping Use    Vaping Use: Never used   Substance and Sexual Activity    Alcohol use: Yes    Drug use: Defer    Sexual activity: Defer        Family History:  Family History   Problem Relation Age of Onset    Hypertension Mother     Hypertension Father         Surgical History:  Past Surgical History:   Procedure Laterality Date    FOOT SURGERY Left     left 2nd toe removal        Allergies:  No Known Allergies     Current Medications:  Current Outpatient Medications   Medication Sig Dispense Refill    albuterol sulfate  (90 Base) MCG/ACT inhaler Inhale 2 puffs Every 4 (Four) Hours As Needed for Wheezing or Shortness of Air (congestion/cough). 8 g 5    amLODIPine Besylate-Celecoxib  MG tablet       cetirizine (Allergy, Cetirizine,) 10 MG tablet       ketorolac (TORADOL) 10 MG tablet Take 1 tablet by mouth Every 6 (Six) Hours As Needed for Mild Pain or Moderate Pain. 10 tablet 0    ondansetron ODT (ZOFRAN-ODT) 4 MG disintegrating tablet Place 1 tablet on the tongue Every 4 (Four) Hours As Needed for Nausea for up to 15 doses. 15 tablet 0    oxyCODONE-acetaminophen (Percocet) 5-325 MG per tablet Take 1-2 tablets by mouth Every 6 (Six) Hours As Needed (pain). 15 tablet 0    tamsulosin (Flomax) 0.4 MG capsule 24 hr capsule Take 1 capsule by mouth Daily. Discontinue if stone passes or lightheaded when upright. 12 capsule 0     No current facility-administered medications for this visit.       Review of systems  A review of systems was performed, and positive findings are noted in the HPI.    Objective     Vital Signs:   Resp 16   Ht 172.7 cm (67.99\")  "  Wt 89.4 kg (197 lb)   BMI 29.96 kg/m²       Physical exam  No acute distress, well-nourished  Lungs: Clear, unlabored  CV: Regular rate, no chest retractions  Awake alert and oriented  Mood normal; affect normal    Results  PROCEDURE:  CT ABDOMEN PELVIS WO CONTRAST     COMPARISON: [facility], CT, CT CHEST LOW DOSE CANCER SCREENING WO, 6/02/2022, 8:18.     INDICATIONS:  LEFT ABDOMINAL PAIN INFERIOR TO RIB CAGE X 1 DAY flank pain, hematuria     TECHNIQUE:    CT images were created without intravenous contrast.       PROTOCOL:     Standard imaging protocol performed                 RADIATION:      DLP: 413 mGy*cm               Automated exposure control was utilized to minimize radiation dose.      FINDINGS:          Subsegmental atelectasis is seen at the left lung base.     The liver is of normal size.  The liver is of diffusely diminished density consistent with mild   fatty infiltration.  The gallbladder is contracted.  No pancreatic or adrenal mass is evident.  The   spleen is of normal size.     7 mm stone is seen in the mid left ureter at the L3-4 level with mild left hydronephrosis.  The   urinary bladder is not abnormally distended.  The prostate gland measures 4.9 cm in greatest   transverse dimension.     Bowel loops are not abnormally dilated.  The appendix is normal.  No diverticula are evident.     Small umbilical hernia and left inguinal hernia containing fat are evident.     Mild degenerative changes are seen in the lower thoracic and lumbar spine.     CONTINUED ON NEXT PAGE...         IMPRESSION:                 CT scan of the abdomen and pelvis without contrast demonstrating subsegmental atelectasis at the   left lung base.     Fatty liver.     7 mm stone in the mid left ureter at the L3-4 level with mild left hydronephrosis.            TANYA BARGER MD         Electronically Signed and Approved By: TANYA BARGER MD on 9/08/2023 at 18:11                Problem List:  Patient Active  Problem List   Diagnosis    Cancer    Dry eyes    High blood pressure    Other acute sinusitis    Heel pain    Seasonal allergies    Smoker    Class 1 obesity due to excess calories without serious comorbidity with body mass index (BMI) of 30.0 to 30.9 in adult    Left ureteral stone       Assessment & Plan   Diagnoses and all orders for this visit:    1. Left ureteral stone (Primary)      Left ureteral calculi-approximately 7 mm in size.  CT imaging reviewed and discussed with patient.  Discussed that patient has a <50%% chance of passing a 7 mm stone without intervention.  No prior history of nephrolithiasis.   Notes persistent pain.  Options discussed including continued trial of passage versus surgical intervention.  He wishes to elect to proceed with definitive surgical management of stone at this time.     Will proceed with cystoscopy, left retrograde pyelogram, ureteroscopy, laser lithotripsy, stent placement.  Risks, benefits and alternatives were discussed with patient including bleeding, infection, damage to surrounding structures, stone recurrence, stricture.  Patient also notes understanding that if unable to reach the level of the stone or if significant purulent discharge noted upon initiation of procedure that stent will be placed in definitive stone management will be deferred until the collecting system is optimized.     Schedule OR   all question addressed at this time.    Transcribed from ambient dictation for Sarah Aleman MD by Lily Bullock.  09/12/23   09:25 EDT    Patient or patient representative verbalized consent to the visit recording.  I have personally performed the services described in this document as transcribed by the above individual, and it is both accurate and complete.

## 2023-09-12 NOTE — H&P (VIEW-ONLY)
UROLOGY OFFICE H&P NOTE    Subjective   HPI  Madi Rehman is a 52 y.o. male.  Presents for evaluation of left ureteral stone.    The patient was seen by his primary care provider on 09/08/2023, who ordered a CT scan. The pain has progressively worsened since that time.   The patient denies a history of kidney stones.   The pain medication has been helping; however, he has expanded it a couple of hours because it is running low. In the first night, the pain started in his back and went around and it was excruciating. The next morning, he woke up with a doctor's appointment and nothing was found. Last night, he felt like he had a stomachache.    Notes nausea, no emesis.  Denies fever.  Denies dysuria.    _______  CT abdomen pelvis without contrast 9/8/2023: 7 mm stone in the mid left ureter at the L3-4 level with mild left hydronephrosis     Results for orders placed or performed during the hospital encounter of 09/09/23   Comprehensive Metabolic Panel    Specimen: Blood   Result Value Ref Range    Glucose 143 (H) 65 - 99 mg/dL    BUN 12 6 - 20 mg/dL    Creatinine 0.96 0.76 - 1.27 mg/dL    Sodium 139 136 - 145 mmol/L    Potassium 4.0 3.5 - 5.2 mmol/L    Chloride 106 98 - 107 mmol/L    CO2 20.1 (L) 22.0 - 29.0 mmol/L    Calcium 9.7 8.6 - 10.5 mg/dL    Total Protein 7.9 6.0 - 8.5 g/dL    Albumin 4.6 3.5 - 5.2 g/dL    ALT (SGPT) 31 1 - 41 U/L    AST (SGOT) 16 1 - 40 U/L    Alkaline Phosphatase 89 39 - 117 U/L    Total Bilirubin 0.4 0.0 - 1.2 mg/dL    Globulin 3.3 gm/dL    A/G Ratio 1.4 g/dL    BUN/Creatinine Ratio 12.5 7.0 - 25.0    Anion Gap 12.9 5.0 - 15.0 mmol/L    eGFR 94.5 >60.0 mL/min/1.73   Lipase    Specimen: Blood   Result Value Ref Range    Lipase 22 13 - 60 U/L   Urinalysis With Microscopic If Indicated (No Culture) - Urine, Clean Catch    Specimen: Urine, Clean Catch   Result Value Ref Range    Color, UA Yellow Yellow, Straw    Appearance, UA Clear Clear    pH, UA 5.5 5.0 - 8.0    Specific Gravity, UA  1.027 1.005 - 1.030    Glucose, UA Negative Negative    Ketones, UA Trace (A) Negative    Bilirubin, UA Negative Negative    Blood, UA Moderate (2+) (A) Negative    Protein, UA Trace (A) Negative    Leuk Esterase, UA Negative Negative    Nitrite, UA Negative Negative    Urobilinogen, UA 1.0 E.U./dL 0.2 - 1.0 E.U./dL   CBC Auto Differential    Specimen: Blood   Result Value Ref Range    WBC 16.21 (H) 3.40 - 10.80 10*3/mm3    RBC 4.96 4.14 - 5.80 10*6/mm3    Hemoglobin 16.5 13.0 - 17.7 g/dL    Hematocrit 46.9 37.5 - 51.0 %    MCV 94.6 79.0 - 97.0 fL    MCH 33.3 (H) 26.6 - 33.0 pg    MCHC 35.2 31.5 - 35.7 g/dL    RDW 13.4 12.3 - 15.4 %    RDW-SD 46.7 37.0 - 54.0 fl    MPV 9.4 6.0 - 12.0 fL    Platelets 268 140 - 450 10*3/mm3    Neutrophil % 75.3 42.7 - 76.0 %    Lymphocyte % 15.7 (L) 19.6 - 45.3 %    Monocyte % 7.0 5.0 - 12.0 %    Eosinophil % 1.0 0.3 - 6.2 %    Basophil % 0.5 0.0 - 1.5 %    Immature Grans % 0.5 0.0 - 0.5 %    Neutrophils, Absolute 12.21 (H) 1.70 - 7.00 10*3/mm3    Lymphocytes, Absolute 2.55 0.70 - 3.10 10*3/mm3    Monocytes, Absolute 1.13 (H) 0.10 - 0.90 10*3/mm3    Eosinophils, Absolute 0.16 0.00 - 0.40 10*3/mm3    Basophils, Absolute 0.08 0.00 - 0.20 10*3/mm3    Immature Grans, Absolute 0.08 (H) 0.00 - 0.05 10*3/mm3    nRBC 0.0 0.0 - 0.2 /100 WBC   Urinalysis, Microscopic Only - Urine, Clean Catch    Specimen: Urine, Clean Catch   Result Value Ref Range    RBC, UA 13-20 (A) None Seen /HPF    WBC, UA 0-2 (A) None Seen /HPF    Bacteria, UA None Seen None Seen /HPF    Squamous Epithelial Cells, UA 0-2 None Seen, 0-2 /HPF    Hyaline Casts, UA 0-2 None Seen /LPF    Methodology Automated Microscopy    Green Top (Gel)   Result Value Ref Range    Extra Tube Hold for add-ons.    Lavender Top   Result Value Ref Range    Extra Tube hold for add-on    Gold Top - SST   Result Value Ref Range    Extra Tube Hold for add-ons.    Light Blue Top   Result Value Ref Range    Extra Tube Hold for add-ons.   "        Medical History:  Past Medical History:   Diagnosis Date    Allergic     Hypertension         Social History:  Social History     Socioeconomic History    Marital status:    Tobacco Use    Smoking status: Every Day     Packs/day: 1.00     Years: 35.00     Pack years: 35.00     Types: Cigarettes     Start date: 1987    Smokeless tobacco: Never   Vaping Use    Vaping Use: Never used   Substance and Sexual Activity    Alcohol use: Yes    Drug use: Defer    Sexual activity: Defer        Family History:  Family History   Problem Relation Age of Onset    Hypertension Mother     Hypertension Father         Surgical History:  Past Surgical History:   Procedure Laterality Date    FOOT SURGERY Left     left 2nd toe removal        Allergies:  No Known Allergies     Current Medications:  Current Outpatient Medications   Medication Sig Dispense Refill    albuterol sulfate  (90 Base) MCG/ACT inhaler Inhale 2 puffs Every 4 (Four) Hours As Needed for Wheezing or Shortness of Air (congestion/cough). 8 g 5    amLODIPine Besylate-Celecoxib  MG tablet       cetirizine (Allergy, Cetirizine,) 10 MG tablet       ketorolac (TORADOL) 10 MG tablet Take 1 tablet by mouth Every 6 (Six) Hours As Needed for Mild Pain or Moderate Pain. 10 tablet 0    ondansetron ODT (ZOFRAN-ODT) 4 MG disintegrating tablet Place 1 tablet on the tongue Every 4 (Four) Hours As Needed for Nausea for up to 15 doses. 15 tablet 0    oxyCODONE-acetaminophen (Percocet) 5-325 MG per tablet Take 1-2 tablets by mouth Every 6 (Six) Hours As Needed (pain). 15 tablet 0    tamsulosin (Flomax) 0.4 MG capsule 24 hr capsule Take 1 capsule by mouth Daily. Discontinue if stone passes or lightheaded when upright. 12 capsule 0     No current facility-administered medications for this visit.       Review of systems  A review of systems was performed, and positive findings are noted in the HPI.    Objective     Vital Signs:   Resp 16   Ht 172.7 cm (67.99\")  "  Wt 89.4 kg (197 lb)   BMI 29.96 kg/m²       Physical exam  No acute distress, well-nourished  Lungs: Clear, unlabored  CV: Regular rate, no chest retractions  Awake alert and oriented  Mood normal; affect normal    Results  PROCEDURE:  CT ABDOMEN PELVIS WO CONTRAST     COMPARISON: [facility], CT, CT CHEST LOW DOSE CANCER SCREENING WO, 6/02/2022, 8:18.     INDICATIONS:  LEFT ABDOMINAL PAIN INFERIOR TO RIB CAGE X 1 DAY flank pain, hematuria     TECHNIQUE:    CT images were created without intravenous contrast.       PROTOCOL:     Standard imaging protocol performed                 RADIATION:      DLP: 413 mGy*cm               Automated exposure control was utilized to minimize radiation dose.      FINDINGS:          Subsegmental atelectasis is seen at the left lung base.     The liver is of normal size.  The liver is of diffusely diminished density consistent with mild   fatty infiltration.  The gallbladder is contracted.  No pancreatic or adrenal mass is evident.  The   spleen is of normal size.     7 mm stone is seen in the mid left ureter at the L3-4 level with mild left hydronephrosis.  The   urinary bladder is not abnormally distended.  The prostate gland measures 4.9 cm in greatest   transverse dimension.     Bowel loops are not abnormally dilated.  The appendix is normal.  No diverticula are evident.     Small umbilical hernia and left inguinal hernia containing fat are evident.     Mild degenerative changes are seen in the lower thoracic and lumbar spine.     CONTINUED ON NEXT PAGE...         IMPRESSION:                 CT scan of the abdomen and pelvis without contrast demonstrating subsegmental atelectasis at the   left lung base.     Fatty liver.     7 mm stone in the mid left ureter at the L3-4 level with mild left hydronephrosis.            TANYA BARGER MD         Electronically Signed and Approved By: TANYA BARGER MD on 9/08/2023 at 18:11                Problem List:  Patient Active  Problem List   Diagnosis    Cancer    Dry eyes    High blood pressure    Other acute sinusitis    Heel pain    Seasonal allergies    Smoker    Class 1 obesity due to excess calories without serious comorbidity with body mass index (BMI) of 30.0 to 30.9 in adult    Left ureteral stone       Assessment & Plan   Diagnoses and all orders for this visit:    1. Left ureteral stone (Primary)      Left ureteral calculi-approximately 7 mm in size.  CT imaging reviewed and discussed with patient.  Discussed that patient has a <50%% chance of passing a 7 mm stone without intervention.  No prior history of nephrolithiasis.   Notes persistent pain.  Options discussed including continued trial of passage versus surgical intervention.  He wishes to elect to proceed with definitive surgical management of stone at this time.     Will proceed with cystoscopy, left retrograde pyelogram, ureteroscopy, laser lithotripsy, stent placement.  Risks, benefits and alternatives were discussed with patient including bleeding, infection, damage to surrounding structures, stone recurrence, stricture.  Patient also notes understanding that if unable to reach the level of the stone or if significant purulent discharge noted upon initiation of procedure that stent will be placed in definitive stone management will be deferred until the collecting system is optimized.     Schedule OR   all question addressed at this time.    Transcribed from ambient dictation for Sarah Aleman MD by Lily Bullock.  09/12/23   09:25 EDT    Patient or patient representative verbalized consent to the visit recording.  I have personally performed the services described in this document as transcribed by the above individual, and it is both accurate and complete.

## 2023-09-14 ENCOUNTER — PATIENT ROUNDING (BHMG ONLY) (OUTPATIENT)
Dept: FAMILY MEDICINE CLINIC | Facility: CLINIC | Age: 53
End: 2023-09-14
Payer: COMMERCIAL

## 2023-09-14 RX ORDER — AMLODIPINE BESYLATE 10 MG/1
10 TABLET ORAL DAILY
COMMUNITY

## 2023-09-14 NOTE — PROGRESS NOTES
September 14, 2023    Hello, may I speak with Madi Rehman?    My name is Paulo Ochoa      I am  with [facility]  [address]  [place] [address]  [phone].    Before we get started may I verify your date of birth? 1970    I am calling to officially welcome you to our practice and ask about your recent visit. Is this a good time to talk? yes    Tell me about your visit with us. What things went well?  The visit was good no complaints        We're always looking for ways to make our patients' experiences even better. Do you have recommendations on ways we may improve?  no    Overall were you satisfied with your first visit to our practice? yes       I appreciate you taking the time to speak with me today. Is there anything else I can do for you? no      Thank you, and have a great day.

## 2023-09-14 NOTE — PRE-PROCEDURE INSTRUCTIONS
IMPORTANT INSTRUCTIONS - PRE-ADMISSION TESTING  DO NOT EAT OR CHEW anything after midnight the night before your procedure.    You may have SIPS CLEAR liquids up to __2____ hours prior to ARRIVAL time. NO RED  Take the following medications the morning of your procedure with JUST A SIP OF WATER:  _ALBUTEROL INHALER IF NEEDED AND BRING WITH YOU, AMLODIPINE, CETIRIZINE, ZOFRAN IF NEEDED, PERCOCET IF NEEDED, FLOMAX______________________________________________________________________________________________________________________________________________________________________________________    DO NOT BRING your medications to the hospital with you, UNLESS something has changed since your PRE-Admission Testing appointment.  Hold all vitamins, supplements, and NSAIDS (Non- steroidal anti-inflammatory meds) for one week prior to surgery (you MAY take Tylenol or Acetaminophen).  If you are diabetic, check your blood sugar the morning of your procedure. If it is less than 70 or if you are feeling symptomatic, call the following number for further instructions: [phone]  Use your inhalers/nebulizers as usual, the morning of your procedure. BRING YOUR INHALERS with you.   Bring your CPAP or BIPAP to hospital, ONLY IF YOU WILL BE SPENDING THE NIGHT.   Make sure you have a ride home and have someone who will stay with you the day of your procedure after you go home.  If you have any questions, please call your Pre-Admission Testing Nurse, __INDIA______________ at [phone]__.   Per anesthesia request, do not smoke for 24 hours before your procedure or as instructed by your surgeon.    NO JEWELRY DAY OF PROCEDURE  ENTRANCE A ELEVATOR A 3RD FLOOR  NO SMOKING 24 HOURS PRIOR TO ARRIVAL

## 2023-09-15 ENCOUNTER — ANESTHESIA EVENT (OUTPATIENT)
Dept: PERIOP | Facility: HOSPITAL | Age: 53
End: 2023-09-15
Payer: OTHER GOVERNMENT

## 2023-09-15 ENCOUNTER — ANESTHESIA (OUTPATIENT)
Dept: PERIOP | Facility: HOSPITAL | Age: 53
End: 2023-09-15
Payer: OTHER GOVERNMENT

## 2023-09-15 ENCOUNTER — HOSPITAL ENCOUNTER (OUTPATIENT)
Facility: HOSPITAL | Age: 53
Setting detail: HOSPITAL OUTPATIENT SURGERY
Discharge: HOME | End: 2023-09-15
Attending: UROLOGY | Admitting: UROLOGY
Payer: OTHER GOVERNMENT

## 2023-09-15 ENCOUNTER — APPOINTMENT (OUTPATIENT)
Dept: GENERAL RADIOLOGY | Facility: HOSPITAL | Age: 53
End: 2023-09-15
Payer: OTHER GOVERNMENT

## 2023-09-15 VITALS
SYSTOLIC BLOOD PRESSURE: 130 MMHG | TEMPERATURE: 97.3 F | HEIGHT: 68 IN | OXYGEN SATURATION: 96 % | WEIGHT: 200.18 LBS | RESPIRATION RATE: 17 BRPM | DIASTOLIC BLOOD PRESSURE: 75 MMHG | BODY MASS INDEX: 30.34 KG/M2 | HEART RATE: 72 BPM

## 2023-09-15 DIAGNOSIS — N23 RENAL COLIC: ICD-10-CM

## 2023-09-15 DIAGNOSIS — N20.1 LEFT URETERAL STONE: ICD-10-CM

## 2023-09-15 PROCEDURE — 93005 ELECTROCARDIOGRAM TRACING: CPT | Performed by: ANESTHESIOLOGY

## 2023-09-15 PROCEDURE — 25010000002 PROPOFOL 10 MG/ML EMULSION: Performed by: MARRIAGE & FAMILY THERAPIST

## 2023-09-15 PROCEDURE — C1758 CATHETER, URETERAL: HCPCS | Performed by: UROLOGY

## 2023-09-15 PROCEDURE — 52332 CYSTOSCOPY AND TREATMENT: CPT | Performed by: UROLOGY

## 2023-09-15 PROCEDURE — 25010000002 DEXAMETHASONE PER 1 MG: Performed by: MARRIAGE & FAMILY THERAPIST

## 2023-09-15 PROCEDURE — 25010000002 FENTANYL CITRATE (PF) 50 MCG/ML SOLUTION: Performed by: MARRIAGE & FAMILY THERAPIST

## 2023-09-15 PROCEDURE — C1769 GUIDE WIRE: HCPCS | Performed by: UROLOGY

## 2023-09-15 PROCEDURE — 0 HYDROMORPHONE 1 MG/ML SOLUTION: Performed by: MARRIAGE & FAMILY THERAPIST

## 2023-09-15 PROCEDURE — 25010000002 LEVOFLOXACIN PER 250 MG: Performed by: UROLOGY

## 2023-09-15 PROCEDURE — C2617 STENT, NON-COR, TEM W/O DEL: HCPCS | Performed by: UROLOGY

## 2023-09-15 PROCEDURE — 25010000002 ONDANSETRON PER 1 MG: Performed by: MARRIAGE & FAMILY THERAPIST

## 2023-09-15 PROCEDURE — 52351 CYSTOURETERO & OR PYELOSCOPE: CPT | Performed by: UROLOGY

## 2023-09-15 PROCEDURE — C1894 INTRO/SHEATH, NON-LASER: HCPCS | Performed by: UROLOGY

## 2023-09-15 PROCEDURE — 76000 FLUOROSCOPY <1 HR PHYS/QHP: CPT

## 2023-09-15 PROCEDURE — 25510000001 IOPAMIDOL PER 1 ML: Performed by: UROLOGY

## 2023-09-15 PROCEDURE — 25010000002 MIDAZOLAM PER 1MG: Performed by: ANESTHESIOLOGY

## 2023-09-15 DEVICE — STNT LITHOSTENT 7F 26CM: Type: IMPLANTABLE DEVICE | Site: URETER | Status: FUNCTIONAL

## 2023-09-15 RX ORDER — PHENYLEPHRINE HCL IN 0.9% NACL 1 MG/10 ML
SYRINGE (ML) INTRAVENOUS AS NEEDED
Status: DISCONTINUED | OUTPATIENT
Start: 2023-09-15 | End: 2023-09-15 | Stop reason: SURG

## 2023-09-15 RX ORDER — PHENAZOPYRIDINE HYDROCHLORIDE 200 MG/1
200 TABLET, FILM COATED ORAL 3 TIMES DAILY PRN
Qty: 20 TABLET | Refills: 0 | Status: SHIPPED | OUTPATIENT
Start: 2023-09-15

## 2023-09-15 RX ORDER — MAGNESIUM HYDROXIDE 1200 MG/15ML
LIQUID ORAL AS NEEDED
Status: DISCONTINUED | OUTPATIENT
Start: 2023-09-15 | End: 2023-09-15 | Stop reason: HOSPADM

## 2023-09-15 RX ORDER — PROMETHAZINE HYDROCHLORIDE 12.5 MG/1
25 TABLET ORAL ONCE AS NEEDED
Status: DISCONTINUED | OUTPATIENT
Start: 2023-09-15 | End: 2023-09-15 | Stop reason: HOSPADM

## 2023-09-15 RX ORDER — KETOROLAC TROMETHAMINE 10 MG/1
10 TABLET, FILM COATED ORAL EVERY 6 HOURS PRN
Qty: 10 TABLET | Refills: 0 | Status: SHIPPED | OUTPATIENT
Start: 2023-09-15

## 2023-09-15 RX ORDER — ONDANSETRON 2 MG/ML
4 INJECTION INTRAMUSCULAR; INTRAVENOUS ONCE AS NEEDED
Status: DISCONTINUED | OUTPATIENT
Start: 2023-09-15 | End: 2023-09-15 | Stop reason: HOSPADM

## 2023-09-15 RX ORDER — IBUPROFEN 600 MG/1
600 TABLET ORAL EVERY 6 HOURS PRN
Status: DISCONTINUED | OUTPATIENT
Start: 2023-09-15 | End: 2023-09-15 | Stop reason: HOSPADM

## 2023-09-15 RX ORDER — OXYCODONE HYDROCHLORIDE AND ACETAMINOPHEN 5; 325 MG/1; MG/1
1-2 TABLET ORAL EVERY 6 HOURS PRN
Qty: 15 TABLET | Refills: 0 | Status: SHIPPED | OUTPATIENT
Start: 2023-09-15

## 2023-09-15 RX ORDER — LEVOFLOXACIN 5 MG/ML
500 INJECTION, SOLUTION INTRAVENOUS ONCE
Status: COMPLETED | OUTPATIENT
Start: 2023-09-15 | End: 2023-09-15

## 2023-09-15 RX ORDER — ACETAMINOPHEN 325 MG/1
650 TABLET ORAL ONCE
Status: DISCONTINUED | OUTPATIENT
Start: 2023-09-15 | End: 2023-09-15 | Stop reason: HOSPADM

## 2023-09-15 RX ORDER — SODIUM CHLORIDE 9 MG/ML
40 INJECTION, SOLUTION INTRAVENOUS AS NEEDED
Status: DISCONTINUED | OUTPATIENT
Start: 2023-09-15 | End: 2023-09-15 | Stop reason: HOSPADM

## 2023-09-15 RX ORDER — ONDANSETRON 2 MG/ML
INJECTION INTRAMUSCULAR; INTRAVENOUS AS NEEDED
Status: DISCONTINUED | OUTPATIENT
Start: 2023-09-15 | End: 2023-09-15 | Stop reason: SURG

## 2023-09-15 RX ORDER — LIDOCAINE HYDROCHLORIDE 20 MG/ML
INJECTION, SOLUTION EPIDURAL; INFILTRATION; INTRACAUDAL; PERINEURAL AS NEEDED
Status: DISCONTINUED | OUTPATIENT
Start: 2023-09-15 | End: 2023-09-15 | Stop reason: SURG

## 2023-09-15 RX ORDER — DEXAMETHASONE SODIUM PHOSPHATE 4 MG/ML
INJECTION, SOLUTION INTRA-ARTICULAR; INTRALESIONAL; INTRAMUSCULAR; INTRAVENOUS; SOFT TISSUE AS NEEDED
Status: DISCONTINUED | OUTPATIENT
Start: 2023-09-15 | End: 2023-09-15 | Stop reason: SURG

## 2023-09-15 RX ORDER — OXYBUTYNIN CHLORIDE 5 MG/1
5 TABLET ORAL 3 TIMES DAILY PRN
Qty: 12 TABLET | Refills: 0 | Status: SHIPPED | OUTPATIENT
Start: 2023-09-15

## 2023-09-15 RX ORDER — FENTANYL CITRATE 50 UG/ML
INJECTION, SOLUTION INTRAMUSCULAR; INTRAVENOUS AS NEEDED
Status: DISCONTINUED | OUTPATIENT
Start: 2023-09-15 | End: 2023-09-15 | Stop reason: SURG

## 2023-09-15 RX ORDER — PROPOFOL 10 MG/ML
VIAL (ML) INTRAVENOUS AS NEEDED
Status: DISCONTINUED | OUTPATIENT
Start: 2023-09-15 | End: 2023-09-15 | Stop reason: SURG

## 2023-09-15 RX ORDER — MEPERIDINE HYDROCHLORIDE 25 MG/ML
12.5 INJECTION INTRAMUSCULAR; INTRAVENOUS; SUBCUTANEOUS
Status: DISCONTINUED | OUTPATIENT
Start: 2023-09-15 | End: 2023-09-15 | Stop reason: HOSPADM

## 2023-09-15 RX ORDER — GLYCOPYRROLATE 0.2 MG/ML
INJECTION INTRAMUSCULAR; INTRAVENOUS AS NEEDED
Status: DISCONTINUED | OUTPATIENT
Start: 2023-09-15 | End: 2023-09-15 | Stop reason: SURG

## 2023-09-15 RX ORDER — ONDANSETRON 4 MG/1
4 TABLET, ORALLY DISINTEGRATING ORAL EVERY 4 HOURS PRN
Qty: 15 TABLET | Refills: 0 | Status: SHIPPED | OUTPATIENT
Start: 2023-09-15

## 2023-09-15 RX ORDER — PROMETHAZINE HYDROCHLORIDE 12.5 MG/1
12.5 TABLET ORAL ONCE AS NEEDED
Status: DISCONTINUED | OUTPATIENT
Start: 2023-09-15 | End: 2023-09-15 | Stop reason: HOSPADM

## 2023-09-15 RX ORDER — MIDAZOLAM HYDROCHLORIDE 2 MG/2ML
2 INJECTION, SOLUTION INTRAMUSCULAR; INTRAVENOUS ONCE
Status: COMPLETED | OUTPATIENT
Start: 2023-09-15 | End: 2023-09-15

## 2023-09-15 RX ORDER — ONDANSETRON 4 MG/1
4 TABLET, FILM COATED ORAL ONCE AS NEEDED
Status: DISCONTINUED | OUTPATIENT
Start: 2023-09-15 | End: 2023-09-15 | Stop reason: HOSPADM

## 2023-09-15 RX ORDER — SODIUM CHLORIDE, SODIUM LACTATE, POTASSIUM CHLORIDE, CALCIUM CHLORIDE 600; 310; 30; 20 MG/100ML; MG/100ML; MG/100ML; MG/100ML
9 INJECTION, SOLUTION INTRAVENOUS CONTINUOUS PRN
Status: DISCONTINUED | OUTPATIENT
Start: 2023-09-15 | End: 2023-09-15 | Stop reason: HOSPADM

## 2023-09-15 RX ORDER — OXYCODONE HYDROCHLORIDE 5 MG/1
5 TABLET ORAL
Status: DISCONTINUED | OUTPATIENT
Start: 2023-09-15 | End: 2023-09-15 | Stop reason: HOSPADM

## 2023-09-15 RX ORDER — PROMETHAZINE HYDROCHLORIDE 25 MG/1
25 SUPPOSITORY RECTAL ONCE AS NEEDED
Status: DISCONTINUED | OUTPATIENT
Start: 2023-09-15 | End: 2023-09-15 | Stop reason: HOSPADM

## 2023-09-15 RX ORDER — ACETAMINOPHEN 500 MG
1000 TABLET ORAL ONCE
Status: COMPLETED | OUTPATIENT
Start: 2023-09-15 | End: 2023-09-15

## 2023-09-15 RX ADMIN — FENTANYL CITRATE 50 MCG: 50 INJECTION, SOLUTION INTRAMUSCULAR; INTRAVENOUS at 09:30

## 2023-09-15 RX ADMIN — ONDANSETRON 4 MG: 2 INJECTION INTRAMUSCULAR; INTRAVENOUS at 09:30

## 2023-09-15 RX ADMIN — LIDOCAINE HYDROCHLORIDE 50 MG: 20 INJECTION, SOLUTION EPIDURAL; INFILTRATION; INTRACAUDAL; PERINEURAL at 09:30

## 2023-09-15 RX ADMIN — Medication 100 MCG: at 09:48

## 2023-09-15 RX ADMIN — SODIUM CHLORIDE, POTASSIUM CHLORIDE, SODIUM LACTATE AND CALCIUM CHLORIDE 9 ML/HR: 600; 310; 30; 20 INJECTION, SOLUTION INTRAVENOUS at 07:42

## 2023-09-15 RX ADMIN — FENTANYL CITRATE 100 MCG: 50 INJECTION, SOLUTION INTRAMUSCULAR; INTRAVENOUS at 09:46

## 2023-09-15 RX ADMIN — Medication 100 MCG: at 09:45

## 2023-09-15 RX ADMIN — FENTANYL CITRATE 50 MCG: 50 INJECTION, SOLUTION INTRAMUSCULAR; INTRAVENOUS at 09:34

## 2023-09-15 RX ADMIN — GLYCOPYRROLATE 0.1 MG: 0.2 INJECTION INTRAMUSCULAR; INTRAVENOUS at 09:30

## 2023-09-15 RX ADMIN — PROPOFOL 150 MG: 10 INJECTION, EMULSION INTRAVENOUS at 09:30

## 2023-09-15 RX ADMIN — DEXAMETHASONE SODIUM PHOSPHATE 4 MG: 4 INJECTION, SOLUTION INTRAMUSCULAR; INTRAVENOUS at 09:30

## 2023-09-15 RX ADMIN — LEVOFLOXACIN 500 MG: 5 INJECTION, SOLUTION INTRAVENOUS at 09:35

## 2023-09-15 RX ADMIN — HYDROMORPHONE HYDROCHLORIDE 1 MG: 1 INJECTION, SOLUTION INTRAMUSCULAR; INTRAVENOUS; SUBCUTANEOUS at 09:54

## 2023-09-15 RX ADMIN — MIDAZOLAM HYDROCHLORIDE 2 MG: 1 INJECTION, SOLUTION INTRAMUSCULAR; INTRAVENOUS at 08:45

## 2023-09-15 RX ADMIN — ACETAMINOPHEN 1000 MG: 500 TABLET ORAL at 07:42

## 2023-09-15 NOTE — ANESTHESIA PREPROCEDURE EVALUATION
Anesthesia Evaluation     Patient summary reviewed and Nursing notes reviewed                Airway   Mallampati: I  TM distance: >3 FB  Neck ROM: full  No difficulty expected  Dental      Pulmonary - negative pulmonary ROS and normal exam    breath sounds clear to auscultation  Cardiovascular - normal exam    Rhythm: regular  Rate: normal    (+) hypertension      Neuro/Psych- negative ROS  GI/Hepatic/Renal/Endo    (+) renal disease    Musculoskeletal (-) negative ROS    Abdominal    Substance History - negative use     OB/GYN negative ob/gyn ROS         Other      history of cancer                  Anesthesia Plan    ASA 2     general     intravenous induction     Anesthetic plan, risks, benefits, and alternatives have been provided, discussed and informed consent has been obtained with: patient.    CODE STATUS:

## 2023-09-15 NOTE — DISCHARGE INSTRUCTIONS
DISCHARGE INSTRUCTIONS  Extracorporeal Shock Wave Lithotripsy (ESWL)/ Ureteroscopy Lasertripsy      For your surgery you had:  General anesthesia (you may have a sore throat for the first 24 hours)  IV sedation  Local anesthesia  Monitored anesthesia care  You received a medicated path for nausea prevention today (behind your ear). It is recommended that you remove it 24-48 hours post-operatively. It must be removed within 72 hours.   You have received an anesthesia medication today that can cause hormonal forms of birth control to be ineffective. You should use a different form of birth control (to prevent pregnancy) for 7 days.   You may experience dizziness, drowsiness, or lightheadedness for several hours following surgery.  Do not stay alone today or tonight.  Limit your activity for 24 hours.  You should not drive or operate machinery, drink alcohol, or sign legally binding documents for 24 hours or while you are taking pain medication.  Resume your diet slowly.  Follow any special dietary instructions you may have been given by your doctor.  Last dose of pain medication was given at:  0742AM 1000mg Tylenol    NOTIFY YOUR DOCTOR IF YOU EXPERIENCE ANY OF THE FOLLOWING:  Temperature greater than 101 degrees Fahrenheit  Shaking Chills  Redness or excessive drainage from incision  Nausea, vomiting and/or pain that is not controlled by prescribed medications  Increase in bleeding or bleeding that is excessive  Unable to urinate in 6 hours after surgery  If unable to reach your doctor, please go to the closest Emergency Room  Strain urine if instructed by physician.  Collect any fragments and take with you on your scheduled appointment. You may pass stone pieces or small blood clots.  Blood in your urine is normal.  It could be light pink to cherry color.  Drink 6-8 glasses of fluid each day to assist with passing of stone fragments.  Back pain is common.  It may feel like a dull ache or back spasm.  Urine will be  bloody for several days.  Slight redness or bruising may be noticed on treated side.  If you have difficulty urinating, try sitting in a bathtub of warm water.    If you have a stent, it must be managed by your urologist.  Do NOT forget.  Medications per physician instructions as indicated on Discharge Medication Information Sheet.  You should see   for follow-up care  on  .  Phone number:      SPECIAL INSTRUCTIONS:

## 2023-09-15 NOTE — ANESTHESIA POSTPROCEDURE EVALUATION
Patient: Madi Rehman    Procedure Summary       Date: 09/15/23 Room / Location: [facility] OR 07 / [facility] MAIN OR    Anesthesia Start: 0925 Anesthesia Stop: 1024    Procedure: CYSTOSCOPY URETEROSCOPY RETROGRADE PYELOGRAM STENT INSERTION, left (Left) Diagnosis:       Left ureteral stone      (Left ureteral stone [N20.1])    Surgeons: Sarah Aleman MD Provider: Mode Hutson MD    Anesthesia Type: general ASA Status: 2            Anesthesia Type: general    Vitals  Vitals Value Taken Time   /80 09/15/23 1054   Temp 36.9 °C (98.5 °F) 09/15/23 1054   Pulse 75 09/15/23 1055   Resp 20 09/15/23 1054   SpO2 96 % 09/15/23 1055   Vitals shown include unvalidated device data.      Post Anesthesia Care and Evaluation    Patient location during evaluation: bedside  Patient participation: complete - patient participated  Level of consciousness: awake  Pain management: adequate    Airway patency: patent  PONV Status: none  Cardiovascular status: acceptable  Respiratory status: acceptable  Hydration status: acceptable    Comments: An Anesthesiologist personally participated in the most demanding procedures (including induction and emergence if applicable) in the anesthesia plan, monitored the course of anesthesia administration at frequent intervals and remained physically present and available for immediate diagnosis and treatment of emergencies.

## 2023-09-15 NOTE — OP NOTE
Preoperative diagnosis  Left ureteral stone    Postoperative diagnosis  Left ureteral stone    Procedure performed  Cystoscopy, left retrograde pyelogram, ureteroscopy, ureteral stent insertion    Attending surgeon  Sarah Aleman MD     Anesthesia  General    EBL  0 mL    Complications  None    Specimen  None    Findings  Cystoscopy without abnormality, bilateral orthotopic ureter; tight distal and mid ureter; unable to reach the proximal ureteral stone or visualize it with flexible ureteroscope    7 x 26 stent no string    Indications  52 y.o. male agreed to undergo the above named procedure after discussion of the alternatives, risks and benefits.   Informed consent was obtained.      Procedure  After informed consent was obtained, the patient was taken back to the  operating suite where general anesthesia was administered. Once the patient  was adequately anesthetized, he was placed in the dorsal lithotomy position.  His genitals were prepped and draped in a normal sterile fashion.  Rigid  cystoscope was inserted gently in the patient's urethra meatus, which passed  atraumatically into the bladder; pan cystoscopy was performed in a typical 360 degree manner, no mucosal abnormalities were noted.  Bilateral orthotopic ureters.  Two wires were then placed in the intrarenal collecting system via the dual-lumen.  The  access sheath was passed using the internal obturator first, to level of the mid ureter.  The ureteroscope was then assembled and ureteroscopy  proceeded proximally.  The mid ureter was noted to be tight, was able to proceed proximally until about 3 cm distal to the anticipated proximal ureteral stone.  At this point, the ureteroscope would not pass more proximally as the ureter was too tight.  At this point, the ureteroscope and access sheath were retrieved under direct vision, leaving the safety wire in place.After withdrawing the  ureteroscope, as well as the access sheath, the wire was placed  over dual-lumen.  Contrast dye was injected revealing filling defect at the proximal ureter, mild hydronephrosis, and some extravasation of contrast.  Dual-lumen was reduced and the wire was back loaded through  the cystoscope and 7 x 26 double J stent was placed over the wire under  direct visualization.  Upon retrieval of the wire, there was good proximal  coil noted on x-ray, as well as distal coil within the bladder. At this  point, the procedure was deemed terminated.  The patient's bladder was then emptied and the cystoscope removed.  He was then placed back in the supine position and awakened from general  anesthesia and transferred to the PACU in good condition.            Signed:  Sarah Aleman MD  09/15/23  10:23 EDT

## 2023-09-18 LAB
QT INTERVAL: 395 MS
QTC INTERVAL: 450 MS

## 2023-09-22 ENCOUNTER — PREP FOR SURGERY (OUTPATIENT)
Dept: OTHER | Facility: HOSPITAL | Age: 53
End: 2023-09-22
Payer: COMMERCIAL

## 2023-09-22 ENCOUNTER — TELEPHONE (OUTPATIENT)
Dept: UROLOGY | Facility: CLINIC | Age: 53
End: 2023-09-22
Payer: COMMERCIAL

## 2023-09-22 DIAGNOSIS — N20.1 LEFT URETERAL STONE: Primary | ICD-10-CM

## 2023-09-22 RX ORDER — LEVOFLOXACIN 5 MG/ML
500 INJECTION, SOLUTION INTRAVENOUS ONCE
OUTPATIENT
Start: 2023-09-22 | End: 2023-09-22

## 2023-09-22 NOTE — TELEPHONE ENCOUNTER
Pt had procedure last week and Dr Hood was unable to get the stone. He was told to call us back to get scheduled for the next step.

## 2023-09-22 NOTE — TELEPHONE ENCOUNTER
----- Message from Chiqui Jenkins sent at 9/19/2023  2:56 PM EDT -----  Regarding: RE: had to put in stent; will need OR again  NEED ORDERS. ADDING TO 9/29.    ----- Message -----  From: Sarah Aleman MD  Sent: 9/15/2023  10:33 AM EDT  To: Chiqui Jenkins  Subject: had to put in stent; will need OR again          Could not reach the level of the stone.  Had to place stent.  Will need [place] stent exchange, left after about a week (minimum) with the stent in.    Will need orders.  Thank you

## 2023-09-22 NOTE — TELEPHONE ENCOUNTER
Called pt who is agreeable to add on to 9/29. Asked rehan to put in orders. Pt is pencilled in the book, not scheduled yet. Advised pt that hospital will call him with an arrival time. Pt expressed understanding and is agreeable.

## 2023-09-27 NOTE — PRE-PROCEDURE INSTRUCTIONS
IMPORTANT INSTRUCTIONS - PRE-ADMISSION TESTING  DO NOT EAT OR CHEW anything after midnight the night before your procedure.    You may have CLEAR liquids up to __2__ hours prior to ARRIVAL time.   Take the following medications the morning of your procedure with JUST A SIP OF WATER:  _ALL AM MEDS AND PRN MEDS IF NEEDED. ___________  DO NOT BRING your medications to the hospital with you, UNLESS something has changed since your PRE-Admission Testing appointment.  Hold all vitamins, supplements, and NSAIDS (Non- steroidal anti-inflammatory meds) for one week prior to surgery (you MAY take Tylenol or Acetaminophen).  If you are diabetic, check your blood sugar the morning of your procedure. If it is less than 70 or if you are feeling symptomatic, call the following number for further instructions: [phone]____.  Use your inhalers/nebulizers as usual, the morning of your procedure. BRING YOUR INHALERS with you.   Bring your CPAP or BIPAP to hospital, ONLY IF YOU WILL BE SPENDING THE NIGHT.   Make sure you have a ride home and have someone who will stay with you the day of your procedure after you go home.  If you have any questions, please call your Pre-Admission Testing Nurse, GUERITA  at [phone]____.   Per anesthesia request, do not smoke for 24 hours before your procedure or as instructed by your surgeon.

## 2023-09-29 ENCOUNTER — ANESTHESIA (OUTPATIENT)
Dept: PERIOP | Facility: HOSPITAL | Age: 53
End: 2023-09-29
Payer: OTHER GOVERNMENT

## 2023-09-29 ENCOUNTER — ANESTHESIA EVENT (OUTPATIENT)
Dept: PERIOP | Facility: HOSPITAL | Age: 53
End: 2023-09-29
Payer: OTHER GOVERNMENT

## 2023-09-29 ENCOUNTER — HOSPITAL ENCOUNTER (OUTPATIENT)
Facility: HOSPITAL | Age: 53
Setting detail: HOSPITAL OUTPATIENT SURGERY
Discharge: HOME | End: 2023-09-29
Attending: UROLOGY | Admitting: UROLOGY
Payer: OTHER GOVERNMENT

## 2023-09-29 ENCOUNTER — APPOINTMENT (OUTPATIENT)
Dept: GENERAL RADIOLOGY | Facility: HOSPITAL | Age: 53
End: 2023-09-29
Payer: OTHER GOVERNMENT

## 2023-09-29 VITALS
TEMPERATURE: 97.3 F | OXYGEN SATURATION: 95 % | DIASTOLIC BLOOD PRESSURE: 81 MMHG | SYSTOLIC BLOOD PRESSURE: 133 MMHG | RESPIRATION RATE: 16 BRPM | HEIGHT: 68 IN | BODY MASS INDEX: 29.44 KG/M2 | HEART RATE: 79 BPM | WEIGHT: 194.22 LBS

## 2023-09-29 DIAGNOSIS — N13.30 HYDRONEPHROSIS, UNSPECIFIED HYDRONEPHROSIS TYPE: Primary | ICD-10-CM

## 2023-09-29 DIAGNOSIS — N20.1 LEFT URETERAL STONE: ICD-10-CM

## 2023-09-29 PROCEDURE — 88300 SURGICAL PATH GROSS: CPT | Performed by: UROLOGY

## 2023-09-29 PROCEDURE — 25010000002 LEVOFLOXACIN PER 250 MG: Performed by: UROLOGY

## 2023-09-29 PROCEDURE — 82365 CALCULUS SPECTROSCOPY: CPT | Performed by: UROLOGY

## 2023-09-29 PROCEDURE — C1769 GUIDE WIRE: HCPCS | Performed by: UROLOGY

## 2023-09-29 PROCEDURE — C1758 CATHETER, URETERAL: HCPCS | Performed by: UROLOGY

## 2023-09-29 PROCEDURE — 25010000002 PROPOFOL 10 MG/ML EMULSION: Performed by: NURSE ANESTHETIST, CERTIFIED REGISTERED

## 2023-09-29 PROCEDURE — 25010000002 MIDAZOLAM PER 1MG: Performed by: ANESTHESIOLOGY

## 2023-09-29 PROCEDURE — 25010000002 FENTANYL CITRATE (PF) 50 MCG/ML SOLUTION: Performed by: NURSE ANESTHETIST, CERTIFIED REGISTERED

## 2023-09-29 PROCEDURE — C2617 STENT, NON-COR, TEM W/O DEL: HCPCS | Performed by: UROLOGY

## 2023-09-29 PROCEDURE — 0 HYDROMORPHONE 1 MG/ML SOLUTION: Performed by: NURSE ANESTHETIST, CERTIFIED REGISTERED

## 2023-09-29 PROCEDURE — 52332 CYSTOSCOPY AND TREATMENT: CPT | Performed by: UROLOGY

## 2023-09-29 PROCEDURE — 25010000002 DEXAMETHASONE PER 1 MG: Performed by: NURSE ANESTHETIST, CERTIFIED REGISTERED

## 2023-09-29 PROCEDURE — 76000 FLUOROSCOPY <1 HR PHYS/QHP: CPT

## 2023-09-29 PROCEDURE — C1894 INTRO/SHEATH, NON-LASER: HCPCS | Performed by: UROLOGY

## 2023-09-29 PROCEDURE — 25010000002 ONDANSETRON PER 1 MG: Performed by: NURSE ANESTHETIST, CERTIFIED REGISTERED

## 2023-09-29 PROCEDURE — 25010000002 KETOROLAC TROMETHAMINE PER 15 MG: Performed by: NURSE ANESTHETIST, CERTIFIED REGISTERED

## 2023-09-29 PROCEDURE — S0260 H&P FOR SURGERY: HCPCS | Performed by: UROLOGY

## 2023-09-29 PROCEDURE — 52352 CYSTOURETERO W/STONE REMOVE: CPT | Performed by: UROLOGY

## 2023-09-29 DEVICE — STNT CLASSC DBL PIG 4.5F 28CM: Type: IMPLANTABLE DEVICE | Site: URETER | Status: FUNCTIONAL

## 2023-09-29 RX ORDER — FENTANYL CITRATE 50 UG/ML
INJECTION, SOLUTION INTRAMUSCULAR; INTRAVENOUS AS NEEDED
Status: DISCONTINUED | OUTPATIENT
Start: 2023-09-29 | End: 2023-09-29 | Stop reason: SURG

## 2023-09-29 RX ORDER — IBUPROFEN 600 MG/1
600 TABLET ORAL EVERY 6 HOURS PRN
Status: DISCONTINUED | OUTPATIENT
Start: 2023-09-29 | End: 2023-09-29 | Stop reason: HOSPADM

## 2023-09-29 RX ORDER — DEXAMETHASONE SODIUM PHOSPHATE 4 MG/ML
INJECTION, SOLUTION INTRA-ARTICULAR; INTRALESIONAL; INTRAMUSCULAR; INTRAVENOUS; SOFT TISSUE AS NEEDED
Status: DISCONTINUED | OUTPATIENT
Start: 2023-09-29 | End: 2023-09-29 | Stop reason: SURG

## 2023-09-29 RX ORDER — ONDANSETRON 4 MG/1
4 TABLET, FILM COATED ORAL ONCE AS NEEDED
Status: DISCONTINUED | OUTPATIENT
Start: 2023-09-29 | End: 2023-09-29 | Stop reason: HOSPADM

## 2023-09-29 RX ORDER — LEVOFLOXACIN 5 MG/ML
500 INJECTION, SOLUTION INTRAVENOUS ONCE
Status: COMPLETED | OUTPATIENT
Start: 2023-09-29 | End: 2023-09-29

## 2023-09-29 RX ORDER — ACETAMINOPHEN 500 MG
1000 TABLET ORAL ONCE
Status: COMPLETED | OUTPATIENT
Start: 2023-09-29 | End: 2023-09-29

## 2023-09-29 RX ORDER — SODIUM CHLORIDE, SODIUM LACTATE, POTASSIUM CHLORIDE, CALCIUM CHLORIDE 600; 310; 30; 20 MG/100ML; MG/100ML; MG/100ML; MG/100ML
9 INJECTION, SOLUTION INTRAVENOUS CONTINUOUS PRN
Status: DISCONTINUED | OUTPATIENT
Start: 2023-09-29 | End: 2023-09-29 | Stop reason: HOSPADM

## 2023-09-29 RX ORDER — PROMETHAZINE HYDROCHLORIDE 12.5 MG/1
12.5 TABLET ORAL ONCE AS NEEDED
Status: DISCONTINUED | OUTPATIENT
Start: 2023-09-29 | End: 2023-09-29 | Stop reason: HOSPADM

## 2023-09-29 RX ORDER — ONDANSETRON 2 MG/ML
4 INJECTION INTRAMUSCULAR; INTRAVENOUS ONCE AS NEEDED
Status: DISCONTINUED | OUTPATIENT
Start: 2023-09-29 | End: 2023-09-29 | Stop reason: HOSPADM

## 2023-09-29 RX ORDER — PROPOFOL 10 MG/ML
VIAL (ML) INTRAVENOUS AS NEEDED
Status: DISCONTINUED | OUTPATIENT
Start: 2023-09-29 | End: 2023-09-29 | Stop reason: SURG

## 2023-09-29 RX ORDER — KETOROLAC TROMETHAMINE 30 MG/ML
INJECTION, SOLUTION INTRAMUSCULAR; INTRAVENOUS AS NEEDED
Status: DISCONTINUED | OUTPATIENT
Start: 2023-09-29 | End: 2023-09-29 | Stop reason: SURG

## 2023-09-29 RX ORDER — ONDANSETRON 2 MG/ML
INJECTION INTRAMUSCULAR; INTRAVENOUS AS NEEDED
Status: DISCONTINUED | OUTPATIENT
Start: 2023-09-29 | End: 2023-09-29 | Stop reason: SURG

## 2023-09-29 RX ORDER — MIDAZOLAM HYDROCHLORIDE 2 MG/2ML
2 INJECTION, SOLUTION INTRAMUSCULAR; INTRAVENOUS ONCE
Status: COMPLETED | OUTPATIENT
Start: 2023-09-29 | End: 2023-09-29

## 2023-09-29 RX ORDER — OXYCODONE HYDROCHLORIDE 5 MG/1
5 TABLET ORAL
Status: DISCONTINUED | OUTPATIENT
Start: 2023-09-29 | End: 2023-09-29 | Stop reason: HOSPADM

## 2023-09-29 RX ORDER — LIDOCAINE HYDROCHLORIDE 20 MG/ML
INJECTION, SOLUTION EPIDURAL; INFILTRATION; INTRACAUDAL; PERINEURAL AS NEEDED
Status: DISCONTINUED | OUTPATIENT
Start: 2023-09-29 | End: 2023-09-29 | Stop reason: SURG

## 2023-09-29 RX ORDER — PROMETHAZINE HYDROCHLORIDE 12.5 MG/1
25 TABLET ORAL ONCE AS NEEDED
Status: DISCONTINUED | OUTPATIENT
Start: 2023-09-29 | End: 2023-09-29 | Stop reason: HOSPADM

## 2023-09-29 RX ORDER — PROMETHAZINE HYDROCHLORIDE 25 MG/1
25 SUPPOSITORY RECTAL ONCE AS NEEDED
Status: DISCONTINUED | OUTPATIENT
Start: 2023-09-29 | End: 2023-09-29 | Stop reason: HOSPADM

## 2023-09-29 RX ORDER — MEPERIDINE HYDROCHLORIDE 25 MG/ML
12.5 INJECTION INTRAMUSCULAR; INTRAVENOUS; SUBCUTANEOUS
Status: DISCONTINUED | OUTPATIENT
Start: 2023-09-29 | End: 2023-09-29 | Stop reason: HOSPADM

## 2023-09-29 RX ORDER — ACETAMINOPHEN 325 MG/1
650 TABLET ORAL ONCE
Status: DISCONTINUED | OUTPATIENT
Start: 2023-09-29 | End: 2023-09-29 | Stop reason: HOSPADM

## 2023-09-29 RX ADMIN — ONDANSETRON 4 MG: 2 INJECTION INTRAMUSCULAR; INTRAVENOUS at 13:33

## 2023-09-29 RX ADMIN — LIDOCAINE HYDROCHLORIDE 30 MG: 20 INJECTION, SOLUTION EPIDURAL; INFILTRATION; INTRACAUDAL; PERINEURAL at 13:31

## 2023-09-29 RX ADMIN — LIDOCAINE HYDROCHLORIDE 70 MG: 20 INJECTION, SOLUTION EPIDURAL; INFILTRATION; INTRACAUDAL; PERINEURAL at 13:13

## 2023-09-29 RX ADMIN — PROPOFOL 170 MG: 10 INJECTION, EMULSION INTRAVENOUS at 13:14

## 2023-09-29 RX ADMIN — LEVOFLOXACIN 500 MG: 5 INJECTION, SOLUTION INTRAVENOUS at 13:12

## 2023-09-29 RX ADMIN — PROPOFOL 30 MG: 10 INJECTION, EMULSION INTRAVENOUS at 13:15

## 2023-09-29 RX ADMIN — HYDROMORPHONE HYDROCHLORIDE 0.5 MG: 1 INJECTION, SOLUTION INTRAMUSCULAR; INTRAVENOUS; SUBCUTANEOUS at 13:32

## 2023-09-29 RX ADMIN — KETOROLAC TROMETHAMINE 15 MG: 30 INJECTION, SOLUTION INTRAMUSCULAR; INTRAVENOUS at 13:30

## 2023-09-29 RX ADMIN — FENTANYL CITRATE 75 MCG: 50 INJECTION, SOLUTION INTRAMUSCULAR; INTRAVENOUS at 13:17

## 2023-09-29 RX ADMIN — DEXAMETHASONE SODIUM PHOSPHATE 4 MG: 4 INJECTION, SOLUTION INTRAMUSCULAR; INTRAVENOUS at 13:16

## 2023-09-29 RX ADMIN — SODIUM CHLORIDE, POTASSIUM CHLORIDE, SODIUM LACTATE AND CALCIUM CHLORIDE 9 ML/HR: 600; 310; 30; 20 INJECTION, SOLUTION INTRAVENOUS at 11:06

## 2023-09-29 RX ADMIN — FENTANYL CITRATE 25 MCG: 50 INJECTION, SOLUTION INTRAMUSCULAR; INTRAVENOUS at 13:13

## 2023-09-29 RX ADMIN — ACETAMINOPHEN 1000 MG: 500 TABLET ORAL at 11:06

## 2023-09-29 RX ADMIN — MIDAZOLAM HYDROCHLORIDE 2 MG: 1 INJECTION, SOLUTION INTRAMUSCULAR; INTRAVENOUS at 12:36

## 2023-09-29 RX ADMIN — SODIUM CHLORIDE, POTASSIUM CHLORIDE, SODIUM LACTATE AND CALCIUM CHLORIDE: 600; 310; 30; 20 INJECTION, SOLUTION INTRAVENOUS at 13:42

## 2023-09-29 NOTE — DISCHARGE INSTRUCTIONS
DISCHARGE INSTRUCTIONS  Ureteroscopy Stent Placement      For your surgery you had:  General anesthesia (you may have a sore throat for the first 24 hours)    You may experience dizziness, drowsiness, or lightheadedness for several hours following surgery.  Do not stay alone today or tonight.  Limit your activity for 24 hours.  You should not drive or operate machinery, drink alcohol, or sign legally binding documents for 24 hours or while you are taking pain medication.  Resume your diet slowly.  Follow any special dietary instructions you may have been given by your doctor.     NOTIFY YOUR DOCTOR IF YOU EXPERIENCE ANY OF THE FOLLOWING:  Temperature greater than 101 degrees Fahrenheit  Shaking Chills  Redness or excessive drainage from incision  Nausea, vomiting and/or pain that is not controlled by prescribed medications  Increase in bleeding or bleeding that is excessive  Unable to urinate in 6 hours after surgery  If unable to reach your doctor, please go to the closest Emergency Room  Strain urine if instructed by physician.  Collect any fragments and take with you on your scheduled appointment. You may pass small blood clots.  Blood in your urine is normal.  It could be light pink to cherry color.  Drink 6-8 glasses of fluid each day to assist with clearing kidneys.  Back pain is common.  It may feel like a dull ache or back spasm.  Urine will be bloody for several days.  Slight redness or bruising may be noticed on treated side.  If you have difficulty urinating, try sitting in a bathtub of warm water.    If you have a stent, it must be managed by your urologist.  Do NOT forget.  Medications per physician instructions as indicated on Discharge Medication Information Sheet.    Last dose of pain medication was given at:   .    SPECIAL INSTRUCTIONS:

## 2023-09-29 NOTE — OP NOTE
Preoperative diagnosis  Left ureteral stone    Postoperative diagnosis  Left ureteral stone    Procedure performed  Cystoscopy, left retrograde pyelogram, ureteroscopy, basket stone extraction, ureteral stent exchange    Attending surgeon  Sarah Aleman MD     Anesthesia  General    EBL  0 mL    Complications  None    Specimen  Left ureteral stone    Findings  Cystoscopy without abnormality; left ureteral stone extracted with basket; no other stones noted; exchange of 4.8 x 26 stent with string    Indications  52 y.o. male agreed to undergo the above named procedure after discussion of the alternatives, risks and benefits.   Informed consent was obtained.      Procedure  After informed consent was obtained, the patient was taken back to the  operating suite where general anesthesia was administered. Once the patient  was adequately anesthetized, he was placed in the dorsal lithotomy position.  His genitals were prepped and draped in a normal sterile fashion.  Rigid  cystoscope was inserted gently in the patient's urethra meatus, which passed  atraumatically into the bladder.  The previously placed left ureteral stent was easily visible.  It was grasped and retrieved through the urethral meatus.  Sensor wire was threaded through it into the renal pelvis and the stent reduced. Second wire was then placed after retrograde pyelogram, the dual-lumen reduced.  One of the wires was secured to the drape as a safety wire for the remainder of the case.  The  access sheath was passed level of the proximal ureter.  The ureteroscope was then assembled and ureteroscopy  proceeded in a systematic manner, stone was encountered in the proximal ureter.  It was grasped entirely with the basket and retrieved.  It was passed off for chemical analysis.  The ureteroscope was then reinserted the ureter was clear of stone.  Once within the kidney, pyeloscopy then proceeded in a systematic manner.  Contrast dye was injected through the  ureteroscope indicating no significant hydronephrosis.  This also ensured all calyces were thoroughly inspected.  There were no stones or fragments encountered.  Ureteroscopy proceeded down the length of the ureter  with retrieval of the access sheath, leaving the safety wire in place. There were no further ureteral calculi  or fragments. After withdrawing the  ureteroscope, as well as the access sheath a 4.8 x 26 double J stent was placed over the wire. Upon retrieval of the wire, there was good proximal  coil noted on x-ray, as well as distal coil within the bladder. At this  point, the procedure was deemed terminated.  The patient's bladder was then emptied and the cystoscope removed.  The string was secured with a Tegaderm.  He was then placed back in the supine position and awakened from general  anesthesia and transferred to the PACU in good condition.        Signed:  Sarah Aleman MD  09/29/23  13:55 EDT

## 2023-09-29 NOTE — ANESTHESIA PREPROCEDURE EVALUATION
Anesthesia Evaluation     Patient summary reviewed and Nursing notes reviewed   no history of anesthetic complications:   NPO Solid Status: > 8 hours  NPO Liquid Status: > 2 hours           Airway   Mallampati: III  TM distance: >3 FB  Neck ROM: full  Anterior and Possible difficult intubation  Dental      Pulmonary - negative pulmonary ROS and normal exam    breath sounds clear to auscultation  Cardiovascular - normal exam  Exercise tolerance: good (4-7 METS)    Rhythm: regular  Rate: normal    (+) hypertension      Neuro/Psych- negative ROS  GI/Hepatic/Renal/Endo    (+) obesity, renal disease stones    Musculoskeletal (-) negative ROS    Abdominal    Substance History - negative use     OB/GYN negative ob/gyn ROS         Other      history of cancer    ROS/Med Hx Other: PAT Nursing Notes unavailable.                 Anesthesia Plan    ASA 2     general     (Patient understands anesthesia not responsible for dental damage.)  intravenous induction     Anesthetic plan, risks, benefits, and alternatives have been provided, discussed and informed consent has been obtained with: patient.  Pre-procedure education provided  Plan discussed with CRNA.    CODE STATUS:

## 2023-09-29 NOTE — H&P
[facility]   Urology Preop H&P Note    Patient Name: Madi Rehman  : 1970  MRN: [id number]  Primary Care Physician:  Gen[place] Lake APRN  Referring Physician: [place] Lake, *  Date of admission: 2023    Subjective   Subjective     Reason for Consult/ Chief Complaint: Left ureteral stone [N20.1]    HPI:  Madi Rehman is a 52 y.o. male history ofLeft ureteral stone [N20.1] who presents for further management OR.  Presents for planned Procedure(s):  CYSTOSCOPY URETEROSCOPY RETROGRADE PYELOGRAM HOLMIUM LASER STENT exchange, left;  .  Procedure to be preformed on the left.  Risk, benefits, and alternatives discussed with patient prior to today.All questions were addressed after providing time for discussion.  Patient denies significant changes since last visit.  No new complaints today.    Review of Systems   All systems were reviewed and negative except for the above  Personal History     Past Medical History:   Diagnosis Date    Hypertension     Kidney stone on left side     Seasonal allergies        Past Surgical History:   Procedure Laterality Date    FOOT SURGERY Left     left 2nd toe removal    URETEROSCOPY LASER LITHOTRIPSY WITH STENT INSERTION Left 9/15/2023    Procedure: CYSTOSCOPY URETEROSCOPY RETROGRADE PYELOGRAM STENT INSERTION, left;  Surgeon: Sarah Aleman MD;  Location: [facility] MAIN OR;  Service: Urology;  Laterality: Left;       Family History: family history includes Hypertension in his father and mother. Otherwise pertinent FHx was reviewed and not pertinent to current issue.    Social History:  reports that he has been smoking cigarettes. He started smoking about 36 years ago. He has a 35.00 pack-year smoking history. He has never used smokeless tobacco. He reports current alcohol use. He reports that he does not use drugs.    Home Medications:  albuterol sulfate HFA, amLODIPine, cetirizine, ketorolac, ondansetron ODT, oxyCODONE-acetaminophen, oxybutynin,  phenazopyridine, and tamsulosin    Allergies:  No Known Allergies    Objective    Objective     Vitals:        Physical Exam:   Constitutional: Awake, alert   Respiratory: Clear, nonlabored respirations    Cardiovascular: Regular rate, no chest retractions   gastrointestinal: Appears soft, nontender     Results:    Assessment & Plan   Assessment / Plan     Brief Patient Summary:  Madi Rehman is a 52 y.o. male who     Active Hospital Problems:  Active Hospital Problems    Diagnosis     **Left ureteral stone        Plan:   Proceed to the OR for planned procedure, Procedure(s):  CYSTOSCOPY URETEROSCOPY RETROGRADE PYELOGRAM HOLMIUM LASER STENT exchange, left,  ,  Risk, benefits, and alternatives discussed with patient at length he is agreeable to proceed  Laterality identified, left  All questions addressed      Electronically signed by Sarah Aleman MD, 09/29/23, 8:58 AM EDT.

## 2023-09-29 NOTE — ANESTHESIA POSTPROCEDURE EVALUATION
Patient: Madi Rehman    Procedure Summary       Date: 09/29/23 Room / Location: [facility] OR 07 / [facility] MAIN OR    Anesthesia Start: 1309 Anesthesia Stop: 1351    Procedure: CYSTOSCOPY URETEROSCOPY RETROGRADE PYELOGRAM,BASKET STONE REMOVAL,LEFT URETERAL STENT EXCHANGE (Left) Diagnosis:       Left ureteral stone      (Left ureteral stone [N20.1])    Surgeons: Sarah Aleman MD Provider: Yeimy Saleh CRNA    Anesthesia Type: general ASA Status: 2            Anesthesia Type: general    Vitals  Vitals Value Taken Time   /80 09/29/23 1420   Temp 36 °C (96.8 °F) 09/29/23 1350   Pulse 79 09/29/23 1423   Resp 18 09/29/23 1410   SpO2 93 % 09/29/23 1423   Vitals shown include unvalidated device data.        Post Anesthesia Care and Evaluation    Patient location during evaluation: bedside  Patient participation: complete - patient participated  Level of consciousness: awake  Pain management: adequate    Airway patency: patent  PONV Status: none  Cardiovascular status: acceptable  Respiratory status: acceptable  Hydration status: acceptable    Comments: An Anesthesiologist personally participated in the most demanding procedures (including induction and emergence if applicable) in the anesthesia plan, monitored the course of anesthesia administration at frequent intervals and remained physically present and available for immediate diagnosis and treatment of emergencies.

## 2023-10-03 LAB
LAB AP CASE REPORT: NORMAL
LAB AP CLINICAL INFORMATION: NORMAL
PATH REPORT.FINAL DX SPEC: NORMAL
PATH REPORT.GROSS SPEC: NORMAL

## 2023-10-09 ENCOUNTER — HOSPITAL ENCOUNTER (OUTPATIENT)
Dept: CT IMAGING | Facility: HOSPITAL | Age: 53
Discharge: HOME | End: 2023-10-09
Payer: OTHER GOVERNMENT

## 2023-10-09 DIAGNOSIS — Z12.2 SCREENING FOR LUNG CANCER: ICD-10-CM

## 2023-10-09 PROCEDURE — 71271 CT THORAX LUNG CANCER SCR C-: CPT

## 2023-10-11 LAB
CALCIUM OXALATE DIHYDRATE MFR STONE IR: 60 %
COLOR STONE: NORMAL
COM MFR STONE: 35 %
COMPN STONE: NORMAL
HYDROXYAPATITE: 5 %
LABORATORY COMMENT REPORT: NORMAL
Lab: NORMAL
Lab: NORMAL
PHOTO: NORMAL
SIZE STONE: NORMAL MM
SPEC SOURCE SUBJ: NORMAL
STONE ANALYSIS-IMP: NORMAL
WT STONE: 32 MG

## 2023-10-25 ENCOUNTER — HOSPITAL ENCOUNTER (OUTPATIENT)
Dept: ULTRASOUND IMAGING | Facility: HOSPITAL | Age: 53
Discharge: HOME | End: 2023-10-25
Admitting: UROLOGY
Payer: OTHER GOVERNMENT

## 2023-10-25 DIAGNOSIS — N13.30 HYDRONEPHROSIS, UNSPECIFIED HYDRONEPHROSIS TYPE: ICD-10-CM

## 2023-10-25 PROCEDURE — 76775 US EXAM ABDO BACK WALL LIM: CPT

## 2023-11-08 ENCOUNTER — OFFICE VISIT (OUTPATIENT)
Dept: UROLOGY | Facility: CLINIC | Age: 53
End: 2023-11-08
Payer: OTHER GOVERNMENT

## 2023-11-08 VITALS
RESPIRATION RATE: 16 BRPM | HEIGHT: 68 IN | BODY MASS INDEX: 29.4 KG/M2 | DIASTOLIC BLOOD PRESSURE: 74 MMHG | SYSTOLIC BLOOD PRESSURE: 136 MMHG | WEIGHT: 194 LBS

## 2023-11-08 DIAGNOSIS — N20.0 CALCIUM OXALATE KIDNEY STONES: Primary | ICD-10-CM

## 2023-11-08 NOTE — PROGRESS NOTES
UROLOGY OFFICE follow-up NOTE    Subjective   HPI  Madi Rehman is a 53 y.o. male.  Presents for follow-up status post left ureteroscopy, stone treatment, 9/29/2023.  Patient subsequently moved stent as instructed and presents with renal ultrasound prior to today's appointment.    Patient states that he has been doing well since the procedure.  Currently denies any urinary symptoms.  Denies hematuria or flank pain.  No complaints today.    _________  Stone analysis 9/29/2023: 95% calcium oxalate    Renal ultrasound 10/25/2023:  Normal appearance of the kidneys with no obstructive uropathy     CT abdomen pelvis without contrast 9/8/2023: 7 mm stone in the mid left ureter at the L3-4 level with mild left hydronephrosis            Results for orders placed or performed during the hospital encounter of 09/29/23   STONE ANALYSIS - Calculus, Ureter, Left    Specimen: Ureter, Left; Calculus   Result Value Ref Range    Stone Source Comment     Color Tan     Size 5x4 mm    Stone Weight 32 mg    Composition Comment     Ca Oxalate - Monohydrate, Stone 35 %    Ca Oxalate-Dihydrate, Stone 60 %    HYDROXYAPATITE 5 %    Comment Comment     Photo Comment     Comments: Comment     Please note Comment     Disclaimer: Comment    Tissue Pathology Exam    Specimen: Ureter, Left; Calculus   Result Value Ref Range    Case Report       Surgical Pathology Report                         Case: [id number]                                  Authorizing Provider:  Sarah Aleman MD      Collected:           09/29/2023 01:26 PM          Ordering Location:     [facility] MAIN Received:            10/02/2023 08:55 AM                                 OR                                                                           Pathologist:           Ann Marie Cazares MD                                                     Specimen:    Ureter, Left, left ureteral stone                                                           "Clinical Information       Left ureteral stone      Final Diagnosis       Left ureteral stone, removal:   - Stone, sent for chemical analysis (gross only diagnosis)         Gross Description       1. Ureter, Left.  The specimen is received fresh labeled \" left ureteral stone\" and consists of a 0.7 x 0.5 x 0.3 cm aggregate of brown, friable renal stones.  The specimen is submitted en toto for chemical analysis following gross examination by staff pathologist Ann Marie Cazares.     SANDRA         Review of systems  A review of systems was performed, and positive findings are noted in the HPI.    Objective     Vital Signs:   There were no vitals taken for this visit.      Physical exam  No acute distress, well-nourished  Awake alert and oriented  Mood normal; affect normal    Problem List:  Patient Active Problem List   Diagnosis    Cancer    Dry eyes    High blood pressure    Other acute sinusitis    Heel pain    Seasonal allergies    Smoker    Class 1 obesity due to excess calories without serious comorbidity with body mass index (BMI) of 30.0 to 30.9 in adult       Assessment & Plan   Diagnoses and all orders for this visit:    1. Calcium oxalate kidney stones (Primary)      Renal ultrasound imaging reviewed and discussed with patient at length, no hydronephrosis, or evidence of residual stone.  No further intervention necessary. Consider patient stone free at this time.      Calcium oxalate stone- Discussed dietary modifications for prevention of stone growth and recurrence including increased hydration, decrease sodium, normal calcium, low animal protein diet.      Informational handouts provided  All questions addressed     Patient to follow-up as needed         "

## 2024-02-29 ENCOUNTER — LAB (OUTPATIENT)
Dept: ONCOLOGY | Facility: HOSPITAL | Age: 54
End: 2024-02-29
Payer: OTHER GOVERNMENT

## 2024-02-29 ENCOUNTER — CONSULT (OUTPATIENT)
Dept: ONCOLOGY | Facility: HOSPITAL | Age: 54
End: 2024-02-29
Payer: OTHER GOVERNMENT

## 2024-02-29 VITALS
DIASTOLIC BLOOD PRESSURE: 76 MMHG | TEMPERATURE: 98.1 F | HEIGHT: 68 IN | HEART RATE: 86 BPM | BODY MASS INDEX: 31.01 KG/M2 | OXYGEN SATURATION: 97 % | WEIGHT: 204.59 LBS | SYSTOLIC BLOOD PRESSURE: 144 MMHG | RESPIRATION RATE: 18 BRPM

## 2024-02-29 DIAGNOSIS — D64.9 ANEMIA, UNSPECIFIED TYPE: Primary | ICD-10-CM

## 2024-02-29 DIAGNOSIS — D72.829 LEUKOCYTOSIS, UNSPECIFIED TYPE: Primary | ICD-10-CM

## 2024-02-29 LAB
ALBUMIN SERPL-MCNC: 4.7 G/DL (ref 3.5–5.2)
ALBUMIN/GLOB SERPL: 1.5 G/DL
ALP SERPL-CCNC: 91 U/L (ref 39–117)
ALT SERPL W P-5'-P-CCNC: 38 U/L (ref 1–41)
ANION GAP SERPL CALCULATED.3IONS-SCNC: 7.4 MMOL/L (ref 5–15)
AST SERPL-CCNC: 15 U/L (ref 1–40)
BASOPHILS # BLD AUTO: 0.05 10*3/MM3 (ref 0–0.2)
BASOPHILS NFR BLD AUTO: 0.4 % (ref 0–1.5)
BILIRUB SERPL-MCNC: 0.5 MG/DL (ref 0–1.2)
BUN SERPL-MCNC: 13 MG/DL (ref 6–20)
BUN/CREAT SERPL: 18.6 (ref 7–25)
CALCIUM SPEC-SCNC: 9.6 MG/DL (ref 8.6–10.5)
CHLORIDE SERPL-SCNC: 102 MMOL/L (ref 98–107)
CLUMPED PLATELETS: PRESENT
CO2 SERPL-SCNC: 25.6 MMOL/L (ref 22–29)
CREAT SERPL-MCNC: 0.7 MG/DL (ref 0.76–1.27)
CYTO UR: NORMAL
DEPRECATED RDW RBC AUTO: 46 FL (ref 37–54)
EGFRCR SERPLBLD CKD-EPI 2021: 109.5 ML/MIN/1.73
EOSINOPHIL # BLD AUTO: 0.2 10*3/MM3 (ref 0–0.4)
EOSINOPHIL # BLD MANUAL: 0.36 10*3/MM3 (ref 0–0.4)
EOSINOPHIL NFR BLD AUTO: 1.7 % (ref 0.3–6.2)
EOSINOPHIL NFR BLD MANUAL: 3 % (ref 0.3–6.2)
ERYTHROCYTE [DISTWIDTH] IN BLOOD BY AUTOMATED COUNT: 12.9 % (ref 12.3–15.4)
GLOBULIN UR ELPH-MCNC: 3.1 GM/DL
GLUCOSE SERPL-MCNC: 110 MG/DL (ref 65–99)
HCT VFR BLD AUTO: 48.6 % (ref 37.5–51)
HGB BLD-MCNC: 16.7 G/DL (ref 13–17.7)
IMM GRANULOCYTES # BLD AUTO: 0.04 10*3/MM3 (ref 0–0.05)
IMM GRANULOCYTES NFR BLD AUTO: 0.3 % (ref 0–0.5)
LAB AP CASE REPORT: NORMAL
LAB AP CLINICAL INFORMATION: NORMAL
LARGE PLATELETS: ABNORMAL
LDH SERPL-CCNC: 120 U/L (ref 135–225)
LYMPHOCYTES # BLD AUTO: 2.92 10*3/MM3 (ref 0.7–3.1)
LYMPHOCYTES # BLD MANUAL: 3.58 10*3/MM3 (ref 0.7–3.1)
LYMPHOCYTES NFR BLD AUTO: 24.5 % (ref 19.6–45.3)
LYMPHOCYTES NFR BLD MANUAL: 4 % (ref 5–12)
MCH RBC QN AUTO: 32.7 PG (ref 26.6–33)
MCHC RBC AUTO-ENTMCNC: 34.4 G/DL (ref 31.5–35.7)
MCV RBC AUTO: 95.1 FL (ref 79–97)
MONOCYTES # BLD AUTO: 0.97 10*3/MM3 (ref 0.1–0.9)
MONOCYTES # BLD: 0.48 10*3/MM3 (ref 0.1–0.9)
MONOCYTES NFR BLD AUTO: 8.1 % (ref 5–12)
NEUTROPHILS # BLD AUTO: 7.52 10*3/MM3 (ref 1.7–7)
NEUTROPHILS NFR BLD AUTO: 65 % (ref 42.7–76)
NEUTROPHILS NFR BLD AUTO: 7.75 10*3/MM3 (ref 1.7–7)
NEUTROPHILS NFR BLD MANUAL: 63 % (ref 42.7–76)
PATH REPORT.FINAL DX SPEC: NORMAL
PATH REPORT.GROSS SPEC: NORMAL
PATHOLOGY REVIEW: YES
PLATELET # BLD AUTO: 266 10*3/MM3 (ref 140–450)
PMV BLD AUTO: 9.4 FL (ref 6–12)
POTASSIUM SERPL-SCNC: 4 MMOL/L (ref 3.5–5.2)
PROT SERPL-MCNC: 7.8 G/DL (ref 6–8.5)
RBC # BLD AUTO: 5.11 10*6/MM3 (ref 4.14–5.8)
RBC MORPH BLD: NORMAL
SMALL PLATELETS BLD QL SMEAR: ADEQUATE
SODIUM SERPL-SCNC: 135 MMOL/L (ref 136–145)
VARIANT LYMPHS NFR BLD MANUAL: 22 % (ref 19.6–45.3)
VARIANT LYMPHS NFR BLD MANUAL: 8 % (ref 0–5)
WBC MORPH BLD: NORMAL
WBC NRBC COR # BLD AUTO: 11.93 10*3/MM3 (ref 3.4–10.8)

## 2024-02-29 PROCEDURE — G0463 HOSPITAL OUTPT CLINIC VISIT: HCPCS | Performed by: NURSE PRACTITIONER

## 2024-02-29 PROCEDURE — 36415 COLL VENOUS BLD VENIPUNCTURE: CPT | Performed by: NURSE PRACTITIONER

## 2024-02-29 PROCEDURE — 80053 COMPREHEN METABOLIC PANEL: CPT | Performed by: NURSE PRACTITIONER

## 2024-02-29 PROCEDURE — 85025 COMPLETE CBC W/AUTO DIFF WBC: CPT | Performed by: NURSE PRACTITIONER

## 2024-02-29 PROCEDURE — 83615 LACTATE (LD) (LDH) ENZYME: CPT | Performed by: NURSE PRACTITIONER

## 2024-02-29 NOTE — PROGRESS NOTES
Chief Complaint/Reason for Referral:   NEW PT - HEMATOLOGY - Elevated white blood cell count, uns    Amy Aj, PA*  [place], [place]ANT[place]    Records Obtained:  Records of the patients history including those obtained from  [facility] and patient information were reviewed and summarized in detail.    Subjective    History of Present Illness  Mr. Madi Rehman is a 53 year old  male presenting for new patient evaluation for leukocytosis.     PMH includes: tobacco abuse of 1 PPD for 35 years, prior kidney stones, obesity, seasonal allergies, hypertension. He has quit smoking in the past for about a 1 year, but no cessation recently. We discussed smoking cessation briefly. He denies any chronic or acute infections, lymphadenopathy or weight loss. Denies any recent antibiotic use. He is receiving low dose CT scans for history of tobacco use and was last done in October of 2023 and did not show any suspicious nodules. Follows with urology ( Dr. Caballero) for kidney stones and last stone was in November of 2023.    Lab work shows he had had intermittent elevated WBC count since at least April of 2020 in the range of 10.40 which was normal in May of 2022 to as high as 16.21 on 9/8/2023. WBC wax and wane in the range of 10.40 to 16.21 but baseline seems to be in the 11-11 range. Differential shows mostly neutrophils but today labs is elevated with neutrophils and lymphocytes up to 3580 and elevated monocytes up to 970.         Oncology/Hematology History    No history exists.       Review of Systems   Constitutional:  Negative for fatigue and unexpected weight loss.   HENT: Negative.     Eyes: Negative.    Respiratory: Negative.     Cardiovascular: Negative.    Gastrointestinal: Negative.    Endocrine: Negative.    Genitourinary: Negative.    Musculoskeletal: Negative.    Skin: Negative.    Allergic/Immunologic: Negative.    Neurological: Negative.    Hematological: Negative.    Psychiatric/Behavioral:  Negative.     All other systems reviewed and are negative.      Current Outpatient Medications on File Prior to Visit   Medication Sig Dispense Refill    albuterol sulfate  (90 Base) MCG/ACT inhaler Inhale 2 puffs Every 4 (Four) Hours As Needed for Wheezing or Shortness of Air (congestion/cough). 8 g 5    amLODIPine (NORVASC) 10 MG tablet Take 1 tablet by mouth Daily.      cetirizine (zyrTEC) 10 MG tablet Take 1 tablet by mouth Daily.      tamsulosin (Flomax) 0.4 MG capsule 24 hr capsule Take 1 capsule by mouth Daily. Discontinue if stone passes or lightheaded when upright. 12 capsule 0    ketorolac (TORADOL) 10 MG tablet Take 1 tablet by mouth Every 6 (Six) Hours As Needed for Mild Pain or Moderate Pain. 10 tablet 0    ondansetron ODT (ZOFRAN-ODT) 4 MG disintegrating tablet Place 1 tablet on the tongue Every 4 (Four) Hours As Needed for Nausea for up to 15 doses. 15 tablet 0    oxybutynin (DITROPAN) 5 MG tablet Take 1 tablet by mouth 3 (Three) Times a Day As Needed (Bladder spasms, bladder cramping). May cause constipation 12 tablet 0    oxyCODONE-acetaminophen (Percocet) 5-325 MG per tablet Take 1-2 tablets by mouth Every 6 (Six) Hours As Needed for Severe Pain (ain). 15 tablet 0    phenazopyridine (Pyridium) 200 MG tablet Take 1 tablet by mouth 3 (Three) Times a Day As Needed for Dysuria (Burning, urinary discomfort). 20 tablet 0     No current facility-administered medications on file prior to visit.       No Known Allergies  Past Medical History:   Diagnosis Date    Hypertension     Kidney stone on left side     Seasonal allergies      Past Surgical History:   Procedure Laterality Date    CYSTOSCOPY W/ URETERAL STENT PLACEMENT Left 9/29/2023    Procedure: CYSTOSCOPY URETEROSCOPY RETROGRADE PYELOGRAM,BASKET STONE REMOVAL,LEFT URETERAL STENT EXCHANGE;  Surgeon: Sarah Aleman MD;  Location: [facility];  Service: Urology;  Laterality: Left;    FOOT SURGERY Left     left 2nd toe removal     URETEROSCOPY LASER LITHOTRIPSY WITH STENT INSERTION Left 9/15/2023    Procedure: CYSTOSCOPY URETEROSCOPY RETROGRADE PYELOGRAM STENT INSERTION, left;  Surgeon: Sarah Aleman MD;  Location: [facility] MAIN OR;  Service: Urology;  Laterality: Left;     Social History     Socioeconomic History    Marital status: Single   Tobacco Use    Smoking status: Every Day     Packs/day: 1.00     Years: 35.00     Additional pack years: 0.00     Total pack years: 35.00     Types: Cigarettes     Start date: 1987    Smokeless tobacco: Never   Vaping Use    Vaping Use: Never used   Substance and Sexual Activity    Alcohol use: Yes     Comment: OCC    Drug use: Never    Sexual activity: Defer     Family History   Problem Relation Age of Onset    Hypertension Mother     Hypertension Father     Malig Hyperthermia Neg Hx      Immunization History   Administered Date(s) Administered    COVID-19 (Bunker Mode) 03/10/2021    COVID-19 (PFIZER) Purple Cap Monovalent 11/15/2021    Flu Vaccine Quad PF >36MO 11/09/2020    Fluzone (or Fluarix & Flulaval for VFC) >6mos 10/21/2021, 09/08/2023    Pneumococcal Conjugate 20-Valent (PCV20) 05/11/2022    Shingrix 06/02/2022, 09/08/2022    Tdap 09/08/2023       Tobacco Use: High Risk (2/29/2024)    Patient History     Smoking Tobacco Use: Every Day     Smokeless Tobacco Use: Never     Passive Exposure: Not on file       Objective     Physical Exam  Vitals and nursing note reviewed.   Constitutional:       Appearance: Normal appearance. He is obese.   HENT:      Head: Normocephalic.      Nose: Nose normal.      Mouth/Throat:      Mouth: Mucous membranes are moist.   Eyes:      Pupils: Pupils are equal, round, and reactive to light.   Cardiovascular:      Rate and Rhythm: Normal rate and regular rhythm.      Pulses: Normal pulses.      Heart sounds: Normal heart sounds. No murmur heard.  Pulmonary:      Effort: Pulmonary effort is normal. No respiratory distress.      Breath sounds: Normal breath sounds.  "  Abdominal:      General: Bowel sounds are normal. There is no distension.      Palpations: Abdomen is soft.      Tenderness: There is no abdominal tenderness.   Musculoskeletal:         General: Normal range of motion.      Cervical back: Normal range of motion and neck supple.   Skin:     General: Skin is warm and dry.      Capillary Refill: Capillary refill takes less than 2 seconds.   Neurological:      General: No focal deficit present.      Mental Status: He is alert and oriented to person, place, and time.   Psychiatric:         Mood and Affect: Mood normal.         Behavior: Behavior normal.         Thought Content: Thought content normal.         Judgment: Judgment normal.         Vitals:    02/29/24 0911   BP: 144/76   Pulse: 86   Resp: 18   Temp: 98.1 °F (36.7 °C)   TempSrc: Temporal   SpO2: 97%   Weight: 92.8 kg (204 lb 9.4 oz)   Height: 172.7 cm (67.99\")   PainSc: 0-No pain       Wt Readings from Last 3 Encounters:   02/29/24 92.8 kg (204 lb 9.4 oz)   11/08/23 88 kg (194 lb)   09/29/23 88.1 kg (194 lb 3.6 oz)       ECOG score: 0         ECOG: (0) Fully Active - Able to Carry On All Pre-disease Performance Without Restriction  Fall Risk Assessment was completed, and patient is at low risk for falls.  PHQ-9 Total Score: 0       The patient is  experiencing fatigue. Fatigue score: 1    PT/OT Functional Screening: PT fx screen : No needs identified  Speech Functional Screening: Speech fx screen : No needs identified  Rehab to be ordered: Rehab to be ordered : No needs identified        Result Review :  The following data was reviewed by: CHANTAL Rivero on 02/29/2024:  Lab Results   Component Value Date    HGB 16.7 02/29/2024    HCT 48.6 02/29/2024    MCV 95.1 02/29/2024     02/29/2024    WBC 11.93 (H) 02/29/2024    NEUTROABS 7.75 (H) 02/29/2024    NEUTROABS 7.52 (H) 02/29/2024    LYMPHSABS 2.92 02/29/2024    MONOSABS 0.97 (H) 02/29/2024    EOSABS 0.20 02/29/2024    EOSABS 0.36 " "02/29/2024    BASOSABS 0.05 02/29/2024     Lab Results   Component Value Date    GLUCOSE 110 (H) 02/29/2024    BUN 13 02/29/2024    CREATININE 0.70 (L) 02/29/2024     (L) 02/29/2024    K 4.0 02/29/2024     02/29/2024    CO2 25.6 02/29/2024    CALCIUM 9.6 02/29/2024    PROTEINTOT 7.8 02/29/2024    ALBUMIN 4.7 02/29/2024    BILITOT 0.5 02/29/2024    ALKPHOS 91 02/29/2024    AST 15 02/29/2024    ALT 38 02/29/2024     Lab Results   Component Value Date     (L) 02/29/2024     No results found for: \"IRON\", \"LABIRON\", \"TRANSFERRIN\", \"TIBC\"  Lab Results   Component Value Date     (L) 02/29/2024     Lab Results   Component Value Date    PSA 0.657 05/11/2022       No Images in the past 120 days found..         Assessment and Plan:  Diagnoses and all orders for this visit:    1. Leukocytosis, unspecified type (Primary)  -     CBC & Differential  -     Comprehensive Metabolic Panel  -     Lactate Dehydrogenase  -     Peripheral Blood Smear  -     CBC & Differential; Future  -     Manual Differential  -     LSAC Slide Creation  -     Pathology Consultation    Chronic leukocytosis in the range of 10.60 to as high as 16 since around 4/2020. He is a chronic tobacco use of 1 PPD and also with history of kidney stones. Chronic leukocytosis is likely secondary to his tobacco use history and/ or underlying chronic inflammation We discussed smoking cessation and would encourage him to consider smoking cessation. No evidence for lymphadenopathy on clinical exam today.     He is receiving low dose CT screening every year to monitor for lung cancer.       Will check CBC, peripheral smear today. Follow up with MD in 4 months with repeat labs. Will call or my chart and discuss lab results.     I spent 30 minutes caring for Madi on this date of service. This time includes time spent by me in the following activities:preparing for the visit, reviewing tests, obtaining and/or reviewing a separately obtained history, " performing a medically appropriate examination and/or evaluation , counseling and educating the patient/family/caregiver, ordering medications, tests, or procedures, referring and communicating with other health care professionals , documenting information in the medical record, independently interpreting results and communicating that information with the patient/family/caregiver, and care coordination    Patient Follow Up: 4 months with MD.     Patient was given instructions and counseling regarding his condition or for health maintenance advice. Please see specific information pulled into the AVS if appropriate.     Khushi Fong, APRN    2/29/2024    .tob

## 2024-03-06 ENCOUNTER — TELEPHONE (OUTPATIENT)
Dept: ONCOLOGY | Facility: HOSPITAL | Age: 54
End: 2024-03-06
Payer: OTHER GOVERNMENT

## 2024-03-08 ENCOUNTER — TELEPHONE (OUTPATIENT)
Dept: GASTROENTEROLOGY | Facility: CLINIC | Age: 54
End: 2024-03-08
Payer: OTHER GOVERNMENT

## 2024-03-08 ENCOUNTER — OFFICE VISIT (OUTPATIENT)
Dept: FAMILY MEDICINE CLINIC | Facility: CLINIC | Age: 54
End: 2024-03-08
Payer: OTHER GOVERNMENT

## 2024-03-08 VITALS
BODY MASS INDEX: 30.74 KG/M2 | HEART RATE: 91 BPM | WEIGHT: 202.8 LBS | TEMPERATURE: 98.1 F | HEIGHT: 68 IN | SYSTOLIC BLOOD PRESSURE: 122 MMHG | OXYGEN SATURATION: 96 % | DIASTOLIC BLOOD PRESSURE: 68 MMHG

## 2024-03-08 DIAGNOSIS — Z12.11 COLON CANCER SCREENING: ICD-10-CM

## 2024-03-08 DIAGNOSIS — I10 PRIMARY HYPERTENSION: Primary | ICD-10-CM

## 2024-03-08 DIAGNOSIS — D72.829 LEUKOCYTOSIS, UNSPECIFIED TYPE: ICD-10-CM

## 2024-03-08 DIAGNOSIS — L98.9 FACIAL SKIN LESION: ICD-10-CM

## 2024-03-08 RX ORDER — BETAMETHASONE DIPROPIONATE 0.5 MG/G
1 CREAM TOPICAL DAILY
Qty: 45 G | Refills: 0 | Status: SHIPPED | OUTPATIENT
Start: 2024-03-08

## 2024-03-08 NOTE — PROGRESS NOTES
Chief Complaint  Chief Complaint   Patient presents with    Hypertension       Subjective      Madi Rehman presents to [facility]  The patient is a 53-year-old male who comes in today for 6-month follow-up on hypertension. Blood pressure is appropriate today at 122/68 mmHg. Blood pressure medication is amlodipine 10 mg daily. When he was last seen, he was found to have a renal stone with hydronephrosis and was referred over to urology and has since had a cystoscopy.    Renal stone  He is doing better than he was the last time he was here. He has never had a renal stone before. He is not on any urology medications anymore. He took very little pain medications.    Elevated white blood cell count  [facility] His white blood cell count was high, so they had him do laboratory studies again, but it was still high, so they referred him to hematology. His white blood cell count was 16 k/uL in 09/2023 when he had the renal stone. He has a follow-up appointment with hematology in 06/2024.     General health  He denies any chest pain. He is due for a colonoscopy. He denies any diarrhea, constipation, change in bowel habits, blood in the stool, or mucus. [facility]    Skin lesion  He has dry patches on his face that are red. They seem to be getting hotter.      He is still smoking and does not want to quit.     Vital Signs  Blood pressure is 122/68 mmHg.    Laboratory Studies  Labs were reviewed with the patient.    Imaging  Lung cancer screening CT was reviewed with the patient.      Objective     Medical History:  Past Medical History:   Diagnosis Date    Hypertension     Kidney stone on left side     Seasonal allergies      Past Surgical History:   Procedure Laterality Date    CYSTOSCOPY W/ URETERAL STENT PLACEMENT Left 9/29/2023    Procedure: CYSTOSCOPY URETEROSCOPY RETROGRADE PYELOGRAM,BASKET STONE REMOVAL,LEFT URETERAL STENT EXCHANGE;   Surgeon: Sarah Aleman MD;  Location: [facility] MAIN OR;  Service: Urology;  Laterality: Left;    FOOT SURGERY Left     left 2nd toe removal    URETEROSCOPY LASER LITHOTRIPSY WITH STENT INSERTION Left 9/15/2023    Procedure: CYSTOSCOPY URETEROSCOPY RETROGRADE PYELOGRAM STENT INSERTION, left;  Surgeon: Sarah Aleman MD;  Location: [facility] MAIN OR;  Service: Urology;  Laterality: Left;      Social History     Tobacco Use    Smoking status: Every Day     Current packs/day: 1.00     Average packs/day: 1 pack/day for 37.2 years (37.2 ttl pk-yrs)     Types: Cigarettes     Start date: 1987    Smokeless tobacco: Never   Vaping Use    Vaping status: Never Used   Substance Use Topics    Alcohol use: Yes     Comment: OCC    Drug use: Never     Family History   Problem Relation Age of Onset    Hypertension Mother     Hypertension Father     Malig Hyperthermia Neg Hx        Medications:  Prior to Admission medications    Medication Sig Start Date End Date Taking? Authorizing Provider   albuterol sulfate  (90 Base) MCG/ACT inhaler Inhale 2 puffs Every 4 (Four) Hours As Needed for Wheezing or Shortness of Air (congestion/cough). 5/11/22  Yes Emily Moran APRN   amLODIPine (NORVASC) 10 MG tablet Take 1 tablet by mouth Daily.   Yes ProviderHa MD   cetirizine (zyrTEC) 10 MG tablet Take 1 tablet by mouth Daily.   Yes ProviderHa MD   tamsulosin (Flomax) 0.4 MG capsule 24 hr capsule Take 1 capsule by mouth Daily. Discontinue if stone passes or lightheaded when upright. 9/9/23   Jayde Guerrero APRN   ketorolac (TORADOL) 10 MG tablet Take 1 tablet by mouth Every 6 (Six) Hours As Needed for Mild Pain or Moderate Pain. 9/15/23 3/8/24  Sarah Aleman MD   ondansetron ODT (ZOFRAN-ODT) 4 MG disintegrating tablet Place 1 tablet on the tongue Every 4 (Four) Hours As Needed for Nausea for up to 15 doses. 9/15/23 3/8/24  Sarah Aleman MD   oxybutynin (DITROPAN) 5 MG tablet Take 1 tablet by mouth  "3 (Three) Times a Day As Needed (Bladder spasms, bladder cramping). May cause constipation 9/15/23 3/8/24  Sarah Aleman MD   oxyCODONE-acetaminophen (Percocet) 5-325 MG per tablet Take 1-2 tablets by mouth Every 6 (Six) Hours As Needed for Severe Pain (ain). 9/15/23 3/8/24  Sarah Aleman MD   phenazopyridine (Pyridium) 200 MG tablet Take 1 tablet by mouth 3 (Three) Times a Day As Needed for Dysuria (Burning, urinary discomfort). 9/15/23 3/8/24  Sarah Aleman MD        Allergies:   Patient has no known allergies.    Health Maintenance Due   Topic Date Due    COVID-19 Vaccine (3 - 2023-24 season) 09/01/2023    COLORECTAL CANCER SCREENING  12/31/2023         Vital Signs:   /68 (BP Location: Left arm, Patient Position: Sitting, Cuff Size: Adult)   Pulse 91   Temp 98.1 °F (36.7 °C) (Oral)   Ht 172.7 cm (67.99\")   Wt 92 kg (202 lb 12.8 oz)   SpO2 96%   BMI 30.84 kg/m²     Wt Readings from Last 3 Encounters:   03/08/24 92 kg (202 lb 12.8 oz)   02/29/24 92.8 kg (204 lb 9.4 oz)   11/08/23 88 kg (194 lb)     BP Readings from Last 3 Encounters:   03/08/24 122/68   02/29/24 144/76   11/08/23 136/74     Physical Exam  Vitals reviewed.   Constitutional:       Appearance: Normal appearance. He is well-developed.   HENT:      Head: Normocephalic and atraumatic.   Eyes:      Conjunctiva/sclera: Conjunctivae normal.      Pupils: Pupils are equal, round, and reactive to light.   Cardiovascular:      Rate and Rhythm: Normal rate and regular rhythm.      Heart sounds: No murmur heard.     No friction rub. No gallop.   Pulmonary:      Effort: Pulmonary effort is normal.      Breath sounds: Normal breath sounds. No wheezing or rhonchi.   Abdominal:      General: Bowel sounds are normal. There is no distension.      Palpations: Abdomen is soft.      Tenderness: There is no abdominal tenderness.   Skin:     General: Skin is warm and dry.   Neurological:      Mental Status: He is alert and oriented to person, " place, and time.      Cranial Nerves: No cranial nerve deficit.   Psychiatric:         Mood and Affect: Mood and affect normal.         Behavior: Behavior normal.         Thought Content: Thought content normal.         Judgment: Judgment normal.         Result Review :    The following data was reviewed by Sherrie Lozada on 03/08/24 at 08:19 EST:    Common labs          9/8/2023    23:54 2/29/2024    09:46   Common Labs   Glucose 143  110    BUN 12  13    Creatinine 0.96  0.70    Sodium 139  135    Potassium 4.0  4.0    Chloride 106  102    Calcium 9.7  9.6    Albumin 4.6  4.7    Total Bilirubin 0.4  0.5    Alkaline Phosphatase 89  91    AST (SGOT) 16  15    ALT (SGPT) 31  38    WBC 16.21  11.93    Hemoglobin 16.5  16.7    Hematocrit 46.9  48.6    Platelets 268  266        No Images in the past 120 days found..                 Assessment and Plan    Diagnoses and all orders for this visit:    1. Primary hypertension (Primary)    2. Colon cancer screening  -     Ambulatory Referral For Screening Colonoscopy    3. Facial skin lesion       Hypertension  His blood pressure is good today. He will continue amlodipine 10 mg daily.    Renal stone with hydronephrosis  He will continue to follow up with urology.    Elevated white blood cell count  His white count was still above normal, but better. He was advised to call hematology today and ask if anyone has had the chance to look at his other laboratory studies.    Skin lesions  It does not appear concerning. We will treat it conservatively. I will send in a prescription for a steroid cream.  If no resolution, patient will need referral over to dermatology.    Health maintenance  Will put in referral for colonoscopy.        Smoking Cessation:    Madi Rehman  reports that he has been smoking cigarettes. He started smoking about 37 years ago. He has a 37.2 pack-year smoking history. He has never used smokeless tobacco.. I have educated him on the risk of diseases from using  tobacco products such as cancer, COPD, and heart disease.     I advised him to quit and he is not willing to quit.    I spent 3  minutes counseling the patient.            Follow Up   Return in about 6 months (around 9/8/2024) for Next scheduled follow up.  Patient was given instructions and counseling regarding his condition or for health maintenance advice. Please see specific information pulled into the AVS if appropriate.     Please note that portions of this note were completed with a voice recognition program.    Transcribed from ambient dictation for CHANTAL Chaves by Sherrie Lozada.  03/08/24   08:19 EST    Patient or patient representative verbalized consent to the visit recording.  I have personally performed the services described in this document as transcribed by the above individual, and it is both accurate and complete.

## 2024-03-08 NOTE — TELEPHONE ENCOUNTER
Attempted to contact patient in regard to new Colon Screening  referral-Left message on  for call back

## 2024-04-03 ENCOUNTER — CLINICAL SUPPORT (OUTPATIENT)
Dept: GASTROENTEROLOGY | Facility: CLINIC | Age: 54
End: 2024-04-03
Payer: OTHER GOVERNMENT

## 2024-04-03 ENCOUNTER — PREP FOR SURGERY (OUTPATIENT)
Dept: OTHER | Facility: HOSPITAL | Age: 54
End: 2024-04-03
Payer: OTHER GOVERNMENT

## 2024-04-03 DIAGNOSIS — Z12.11 ENCOUNTER FOR SCREENING COLONOSCOPY: Primary | ICD-10-CM

## 2024-04-03 NOTE — PROGRESS NOTES
Madi Rehman  1970  53 y.o.    Reason for call: Screening Colonoscopy, no previous  Prep prescribed: Clenpiq  Prep instructions reviewed with patient and sent to patient via regular mail to the home address on file  Is the patient currently on any injectable medications for weight loss or diabetes? No  Clearance needed? No  If yes, what clearance is needed? N/A  Clearance has been requested from   The patient has been scheduled for: Colonoscopy  Family history of colon cancer? No  If yes, indicate relative:   Family History   Problem Relation Age of Onset    Hypertension Mother     Hypertension Father     Malig Hyperthermia Neg Hx     Colon cancer Neg Hx      Past Medical History:   Diagnosis Date    Hypertension     Kidney stone on left side     Seasonal allergies      No Known Allergies  Past Surgical History:   Procedure Laterality Date    CYSTOSCOPY W/ URETERAL STENT PLACEMENT Left 9/29/2023    Procedure: CYSTOSCOPY URETEROSCOPY RETROGRADE PYELOGRAM,BASKET STONE REMOVAL,LEFT URETERAL STENT EXCHANGE;  Surgeon: Sarah Aleman MD;  Location: [facility];  Service: Urology;  Laterality: Left;    FOOT SURGERY Left     left 2nd toe removal    URETEROSCOPY LASER LITHOTRIPSY WITH STENT INSERTION Left 9/15/2023    Procedure: CYSTOSCOPY URETEROSCOPY RETROGRADE PYELOGRAM STENT INSERTION, left;  Surgeon: Sarah Aleman MD;  Location: [facility];  Service: Urology;  Laterality: Left;     Social History     Socioeconomic History    Marital status: Single   Tobacco Use    Smoking status: Every Day     Current packs/day: 1.00     Average packs/day: 1 pack/day for 37.3 years (37.3 ttl pk-yrs)     Types: Cigarettes     Start date: 1987     Passive exposure: Current    Smokeless tobacco: Never   Vaping Use    Vaping status: Never Used   Substance and Sexual Activity    Alcohol use: Yes     Comment: OCC    Drug use: Never    Sexual activity: Defer       Current Outpatient Medications:     albuterol sulfate HFA  108 (90 Base) MCG/ACT inhaler, Inhale 2 puffs Every 4 (Four) Hours As Needed for Wheezing or Shortness of Air (congestion/cough)., Disp: 8 g, Rfl: 5    amLODIPine (NORVASC) 10 MG tablet, Take 1 tablet by mouth Daily., Disp: , Rfl:     cetirizine (zyrTEC) 10 MG tablet, Take 1 tablet by mouth Daily., Disp: , Rfl:     betamethasone dipropionate 0.05 % cream, Apply 1 Application topically to the appropriate area as directed Daily. (Patient not taking: Reported on 4/3/2024), Disp: 45 g, Rfl: 0

## 2024-04-04 NOTE — PRE-PROCEDURE INSTRUCTIONS
"PAT call attempted.  No answer.  Detailed message with date and arrival time of 0700 given.  Come to entrance \"C\"; must have adult  for transportation home; may have two visitors; however, children under 12 must remain in waiting area; instructed on diet/clear liquids/NPO/bowel prep, if needed; may take normal meds two hours prior to arrival time except for blood thinners, antidiabetics, diuretics, and weight loss meds.  Instructed to return call to confirm receipt of instructions and for any questions.  "

## 2024-04-11 ENCOUNTER — ANESTHESIA EVENT (OUTPATIENT)
Dept: GASTROENTEROLOGY | Facility: HOSPITAL | Age: 54
End: 2024-04-11
Payer: OTHER GOVERNMENT

## 2024-04-11 NOTE — ANESTHESIA PREPROCEDURE EVALUATION
Anesthesia Evaluation     Patient summary reviewed, Nursing notes reviewed and pregnancy test completed   NPO Solid Status: > 8 hours  NPO Liquid Status: > 8 hours           Airway   Mallampati: III  Neck ROM: full  Possible difficult intubation, Small opening and Anterior  Comment: Large tongue  Dental          Pulmonary - normal exam   (+) a smoker Current, Smoked day of surgery,sleep apnea (bipap)  Cardiovascular - normal exam    ECG reviewed  Rhythm: regular    (+) hypertension      Neuro/Psych  GI/Hepatic/Renal/Endo    (+) obesity, morbid obesity, renal disease- stones    Musculoskeletal     Abdominal    Substance History      OB/GYN          Other      history of cancer (toe-melanoma)    ROS/Med Hx Other: EKG 9/18/2023:  Sinus rhythm  RSR' in V1 or V2, probably normal variant                      Anesthesia Plan    ASA 2     general   total IV anesthesia  (Total IV Anesthesia    Patient understands anesthesia not responsible for dental damage.  )  intravenous induction     Anesthetic plan, risks, benefits, and alternatives have been provided, discussed and informed consent has been obtained with: patient.    Plan discussed with CRNA.        CODE STATUS:

## 2024-04-15 ENCOUNTER — ANESTHESIA (OUTPATIENT)
Dept: GASTROENTEROLOGY | Facility: HOSPITAL | Age: 54
End: 2024-04-15
Payer: OTHER GOVERNMENT

## 2024-04-15 ENCOUNTER — HOSPITAL ENCOUNTER (OUTPATIENT)
Facility: HOSPITAL | Age: 54
Setting detail: HOSPITAL OUTPATIENT SURGERY
Discharge: HOME | End: 2024-04-15
Attending: INTERNAL MEDICINE | Admitting: INTERNAL MEDICINE
Payer: OTHER GOVERNMENT

## 2024-04-15 VITALS
WEIGHT: 197.09 LBS | DIASTOLIC BLOOD PRESSURE: 99 MMHG | TEMPERATURE: 97.8 F | SYSTOLIC BLOOD PRESSURE: 151 MMHG | OXYGEN SATURATION: 95 % | RESPIRATION RATE: 16 BRPM | HEIGHT: 68 IN | HEART RATE: 77 BPM | BODY MASS INDEX: 29.87 KG/M2

## 2024-04-15 DIAGNOSIS — Z12.11 ENCOUNTER FOR SCREENING COLONOSCOPY: ICD-10-CM

## 2024-04-15 PROCEDURE — 25810000003 LACTATED RINGERS PER 1000 ML: Performed by: NURSE ANESTHETIST, CERTIFIED REGISTERED

## 2024-04-15 PROCEDURE — 25010000002 PROPOFOL 10 MG/ML EMULSION: Performed by: NURSE ANESTHETIST, CERTIFIED REGISTERED

## 2024-04-15 RX ORDER — SODIUM CHLORIDE, SODIUM LACTATE, POTASSIUM CHLORIDE, CALCIUM CHLORIDE 600; 310; 30; 20 MG/100ML; MG/100ML; MG/100ML; MG/100ML
30 INJECTION, SOLUTION INTRAVENOUS CONTINUOUS
Status: DISCONTINUED | OUTPATIENT
Start: 2024-04-15 | End: 2024-04-15 | Stop reason: HOSPADM

## 2024-04-15 RX ORDER — PROPOFOL 10 MG/ML
VIAL (ML) INTRAVENOUS AS NEEDED
Status: DISCONTINUED | OUTPATIENT
Start: 2024-04-15 | End: 2024-04-15 | Stop reason: SURG

## 2024-04-15 RX ORDER — LIDOCAINE HYDROCHLORIDE 20 MG/ML
INJECTION, SOLUTION EPIDURAL; INFILTRATION; INTRACAUDAL; PERINEURAL AS NEEDED
Status: DISCONTINUED | OUTPATIENT
Start: 2024-04-15 | End: 2024-04-15 | Stop reason: SURG

## 2024-04-15 RX ADMIN — LIDOCAINE HYDROCHLORIDE 100 MG: 20 INJECTION, SOLUTION EPIDURAL; INFILTRATION; INTRACAUDAL; PERINEURAL at 08:39

## 2024-04-15 RX ADMIN — SODIUM CHLORIDE, POTASSIUM CHLORIDE, SODIUM LACTATE AND CALCIUM CHLORIDE 30 ML/HR: 600; 310; 30; 20 INJECTION, SOLUTION INTRAVENOUS at 07:35

## 2024-04-15 RX ADMIN — PROPOFOL 50 MG: 10 INJECTION, EMULSION INTRAVENOUS at 08:39

## 2024-04-15 RX ADMIN — PROPOFOL 250 MCG/KG/MIN: 10 INJECTION, EMULSION INTRAVENOUS at 08:39

## 2024-04-15 NOTE — H&P
ScreeningPre Procedure History & Physical    Chief Complaint:   Screening     Subjective     HPI:   Screening     Past Medical History:   Past Medical History:   Diagnosis Date    Hypertension     Kidney stone on left side     Seasonal allergies        Past Surgical History:  Past Surgical History:   Procedure Laterality Date    CYSTOSCOPY W/ URETERAL STENT PLACEMENT Left 9/29/2023    Procedure: CYSTOSCOPY URETEROSCOPY RETROGRADE PYELOGRAM,BASKET STONE REMOVAL,LEFT URETERAL STENT EXCHANGE;  Surgeon: Sarah Aleman MD;  Location: [facility];  Service: Urology;  Laterality: Left;    FOOT SURGERY Left     left 2nd toe removal    URETEROSCOPY LASER LITHOTRIPSY WITH STENT INSERTION Left 9/15/2023    Procedure: CYSTOSCOPY URETEROSCOPY RETROGRADE PYELOGRAM STENT INSERTION, left;  Surgeon: Sarah Aleman MD;  Location: [facility] OR;  Service: Urology;  Laterality: Left;       Family History:  Family History   Problem Relation Age of Onset    Hypertension Mother     Hypertension Father     Malig Hyperthermia Neg Hx     Colon cancer Neg Hx        Social History:   reports that he has been smoking cigarettes. He started smoking about 37 years ago. He has a 37.3 pack-year smoking history. He has been exposed to tobacco smoke. He has never used smokeless tobacco. He reports current alcohol use. He reports that he does not use drugs.    Medications:   Medications Prior to Admission   Medication Sig Dispense Refill Last Dose    amLODIPine (NORVASC) 10 MG tablet Take 1 tablet by mouth Daily.   4/14/2024    cetirizine (zyrTEC) 10 MG tablet Take 1 tablet by mouth Daily.   4/14/2024    albuterol sulfate  (90 Base) MCG/ACT inhaler Inhale 2 puffs Every 4 (Four) Hours As Needed for Wheezing or Shortness of Air (congestion/cough). 8 g 5 More than a month       Allergies:  Patient has no known allergies.        Objective     Blood pressure 138/86, pulse 93, temperature 97.9 °F (36.6 °C), temperature source Temporal,  "resp. rate 16, height 172.7 cm (68\"), weight 89.4 kg (197 lb 1.5 oz), SpO2 94%.    Physical Exam   Constitutional: Pt is oriented to person, place, and time and well-developed, well-nourished, and in no distress.   Mouth/Throat: Oropharynx is clear and moist.   Neck: Normal range of motion.   Cardiovascular: Normal rate, regular rhythm and normal heart sounds.    Pulmonary/Chest: Effort normal and breath sounds normal.   Abdominal: Soft. Nontender  Skin: Skin is warm and dry.   Psychiatric: Mood, memory, affect and judgment normal.     Assessment & Plan     Diagnosis:  Screening colonoscopy       Anticipated Surgical Procedure:  colonoscopy    The risks, benefits, and alternatives of this procedure have been discussed with the patient or the responsible party- the patient understands and agrees to proceed.            "

## 2024-04-15 NOTE — ANESTHESIA POSTPROCEDURE EVALUATION
Patient: Madi Rehman    Procedure Summary       Date: 04/15/24 Room / Location: [facility] ENDOSCOPY 2 / [facility] ENDOSCOPY    Anesthesia Start: 0838 Anesthesia Stop: 0907    Procedure: COLONOSCOPY WITH POLYPECTOMY Diagnosis:       Encounter for screening colonoscopy      (Encounter for screening colonoscopy [Z12.11])    Surgeons: Melquiades Malik MD Provider: Martin Aj CRNA    Anesthesia Type: general ASA Status: 2            Anesthesia Type: general    Vitals  Vitals Value Taken Time   /99 04/15/24 0917   Temp 36.6 °C (97.8 °F) 04/15/24 0901   Pulse 74 04/15/24 0917   Resp 16 04/15/24 0915   SpO2 96 % 04/15/24 0917   Vitals shown include unfiled device data.        Post Anesthesia Care and Evaluation    Post-procedure mental status: acceptable.  Pain management: satisfactory to patient    Airway patency: patent  Anesthetic complications: No anesthetic complications    Cardiovascular status: acceptable  Respiratory status: acceptable    Comments: Per chart review

## 2024-04-16 ENCOUNTER — TELEPHONE (OUTPATIENT)
Dept: GASTROENTEROLOGY | Facility: CLINIC | Age: 54
End: 2024-04-16
Payer: OTHER GOVERNMENT

## 2024-04-16 NOTE — TELEPHONE ENCOUNTER
I have reviewed the patient's most recent colonoscopy report.  Patient had small polyp completely removed from the descending colon.  Internal hemorrhoids noted as well.  Patient will need repeat colonoscopy in 5 years.  Please place in recall.  Send letter.

## 2024-06-26 ENCOUNTER — LAB (OUTPATIENT)
Dept: ONCOLOGY | Facility: HOSPITAL | Age: 54
End: 2024-06-26
Payer: OTHER GOVERNMENT

## 2024-06-26 DIAGNOSIS — D72.829 LEUKOCYTOSIS, UNSPECIFIED TYPE: ICD-10-CM

## 2024-06-26 LAB
BASOPHILS # BLD AUTO: 0.05 10*3/MM3 (ref 0–0.2)
BASOPHILS NFR BLD AUTO: 0.4 % (ref 0–1.5)
DEPRECATED RDW RBC AUTO: 47.4 FL (ref 37–54)
EOSINOPHIL # BLD AUTO: 0.2 10*3/MM3 (ref 0–0.4)
EOSINOPHIL NFR BLD AUTO: 1.5 % (ref 0.3–6.2)
ERYTHROCYTE [DISTWIDTH] IN BLOOD BY AUTOMATED COUNT: 13.4 % (ref 12.3–15.4)
HCT VFR BLD AUTO: 45.1 % (ref 37.5–51)
HGB BLD-MCNC: 15.6 G/DL (ref 13–17.7)
IMM GRANULOCYTES # BLD AUTO: 0.03 10*3/MM3 (ref 0–0.05)
IMM GRANULOCYTES NFR BLD AUTO: 0.2 % (ref 0–0.5)
LYMPHOCYTES # BLD AUTO: 3.24 10*3/MM3 (ref 0.7–3.1)
LYMPHOCYTES NFR BLD AUTO: 24.5 % (ref 19.6–45.3)
MCH RBC QN AUTO: 32.6 PG (ref 26.6–33)
MCHC RBC AUTO-ENTMCNC: 34.6 G/DL (ref 31.5–35.7)
MCV RBC AUTO: 94.4 FL (ref 79–97)
MONOCYTES # BLD AUTO: 0.89 10*3/MM3 (ref 0.1–0.9)
MONOCYTES NFR BLD AUTO: 6.7 % (ref 5–12)
NEUTROPHILS NFR BLD AUTO: 66.7 % (ref 42.7–76)
NEUTROPHILS NFR BLD AUTO: 8.82 10*3/MM3 (ref 1.7–7)
PLATELET # BLD AUTO: 245 10*3/MM3 (ref 140–450)
PMV BLD AUTO: 9.7 FL (ref 6–12)
RBC # BLD AUTO: 4.78 10*6/MM3 (ref 4.14–5.8)
WBC NRBC COR # BLD AUTO: 13.23 10*3/MM3 (ref 3.4–10.8)

## 2024-06-26 PROCEDURE — 36415 COLL VENOUS BLD VENIPUNCTURE: CPT

## 2024-06-26 PROCEDURE — 85025 COMPLETE CBC W/AUTO DIFF WBC: CPT

## 2024-06-28 ENCOUNTER — OFFICE VISIT (OUTPATIENT)
Dept: ONCOLOGY | Facility: HOSPITAL | Age: 54
End: 2024-06-28
Payer: OTHER GOVERNMENT

## 2024-06-28 VITALS
WEIGHT: 196 LBS | OXYGEN SATURATION: 97 % | DIASTOLIC BLOOD PRESSURE: 78 MMHG | TEMPERATURE: 96.9 F | SYSTOLIC BLOOD PRESSURE: 141 MMHG | BODY MASS INDEX: 29.8 KG/M2 | RESPIRATION RATE: 20 BRPM | HEART RATE: 88 BPM

## 2024-06-28 DIAGNOSIS — Z72.0 TOBACCO ABUSE: ICD-10-CM

## 2024-06-28 DIAGNOSIS — D72.829 LEUKOCYTOSIS, UNSPECIFIED TYPE: Primary | ICD-10-CM

## 2024-06-28 DIAGNOSIS — G47.33 OSA (OBSTRUCTIVE SLEEP APNEA): ICD-10-CM

## 2024-06-28 PROCEDURE — 99214 OFFICE O/P EST MOD 30 MIN: CPT | Performed by: NURSE PRACTITIONER

## 2024-06-28 PROCEDURE — G0463 HOSPITAL OUTPT CLINIC VISIT: HCPCS | Performed by: NURSE PRACTITIONER

## 2024-06-28 NOTE — PROGRESS NOTES
Chief Complaint/Reason for Referral:  ELAVATED WHITE BLOOD COUNT    Jaya Geneva Aguilar,*  [place] Aguilar, APRN        Subjective    History of Present Illness    Mr. Madi Rehman seen in follow up for leukocytosis. Seen initially in February of this year. Reports he is still smoking. He is not interested in smoking cessation at this time. Reports he has RUSTY and uses CPAP.     WBC ranges from normal to 16.21 that waxes and wanes. Likely secondary to chronic tobacco abuse. Most recent lab on 6/26/2024 with WBC of 13.23, normal hemoglobin and normal platelet count. Differential did show some atypical lymphocytes with absolute neutrophilia and absolute lymphocytosis. He denies any weight loss, lymphadenopathy, fevers, chills or recurring infections. Reports leg swelling.       He is receiving low dose CT scans for history of chronic tobacco abuse. Last done in October of 23 and did not show any pulmonary nodules.         Oncology/Hematology History    No history exists.    2/29/2024 consult:     History of Present Illness  Mr. Madi Rehman is a 53 year old  male presenting for new patient evaluation for leukocytosis.      PMH includes: tobacco abuse of 1 PPD for 35 years, prior kidney stones, obesity, seasonal allergies, hypertension. He has quit smoking in the past for about a 1 year, but no cessation recently. We discussed smoking cessation briefly. He denies any chronic or acute infections, lymphadenopathy or weight loss. Denies any recent antibiotic use. He is receiving low dose CT scans for history of tobacco use and was last done in October of 2023 and did not show any suspicious nodules. Follows with urology ( Dr. Caballero) for kidney stones and last stone was in November of 2023.     Lab work shows he had had intermittent elevated WBC count since at least April of 2020 in the range of 10.40 which was normal in May of 2022 to as high as 16.21 on 9/8/2023. WBC wax and wane in the range of  10.40 to 16.21 but baseline seems to be in the 11-11 range. Differential shows mostly neutrophils but today labs is elevated with neutrophils and lymphocytes up to 3580 and elevated monocytes up to 970.           Review of Systems   Constitutional:  Positive for fatigue. Negative for fever and unexpected weight loss.   HENT: Negative.  Negative for sore throat, swollen glands and trouble swallowing.    Eyes: Negative.    Respiratory: Negative.     Cardiovascular:  Positive for leg swelling (IN ANKLE LEFT).   Gastrointestinal: Negative.    Endocrine: Negative.    Genitourinary: Negative.    Musculoskeletal: Negative.    Allergic/Immunologic: Negative.    Neurological: Negative.    Hematological: Negative.    Psychiatric/Behavioral: Negative.         Current Outpatient Medications on File Prior to Visit   Medication Sig Dispense Refill    albuterol sulfate  (90 Base) MCG/ACT inhaler Inhale 2 puffs Every 4 (Four) Hours As Needed for Wheezing or Shortness of Air (congestion/cough). 8 g 5    amLODIPine (NORVASC) 10 MG tablet Take 1 tablet by mouth Daily.      cetirizine (zyrTEC) 10 MG tablet Take 1 tablet by mouth Daily.       No current facility-administered medications on file prior to visit.       No Known Allergies  Past Medical History:   Diagnosis Date    Hypertension     Kidney stone on left side     Seasonal allergies      Past Surgical History:   Procedure Laterality Date    COLONOSCOPY N/A 4/15/2024    Procedure: COLONOSCOPY WITH POLYPECTOMY;  Surgeon: Melquiades Malik MD;  Location: [facility] ENDOSCOPY;  Service: Gastroenterology;  Laterality: N/A;  COLON POLYP    CYSTOSCOPY W/ URETERAL STENT PLACEMENT Left 9/29/2023    Procedure: CYSTOSCOPY URETEROSCOPY RETROGRADE PYELOGRAM,BASKET STONE REMOVAL,LEFT URETERAL STENT EXCHANGE;  Surgeon: Sarah Aleman MD;  Location: [facility] MAIN OR;  Service: Urology;  Laterality: Left;    FOOT SURGERY Left     left 2nd toe removal    URETEROSCOPY LASER LITHOTRIPSY  WITH STENT INSERTION Left 9/15/2023    Procedure: CYSTOSCOPY URETEROSCOPY RETROGRADE PYELOGRAM STENT INSERTION, left;  Surgeon: Sarah Aleman MD;  Location: [facility];  Service: Urology;  Laterality: Left;     Social History     Socioeconomic History    Marital status: Single   Tobacco Use    Smoking status: Every Day     Current packs/day: 1.00     Average packs/day: 1 pack/day for 37.5 years (37.5 ttl pk-yrs)     Types: Cigarettes     Start date: 1987     Passive exposure: Current    Smokeless tobacco: Never   Vaping Use    Vaping status: Never Used   Substance and Sexual Activity    Alcohol use: Yes     Comment: OCC    Drug use: Never    Sexual activity: Defer     Family History   Problem Relation Age of Onset    Hypertension Mother     Hypertension Father     Malig Hyperthermia Neg Hx     Colon cancer Neg Hx      Immunization History   Administered Date(s) Administered    COVID-19 (Resilience) 03/10/2021    COVID-19 (PFIZER) Purple Cap Monovalent 11/15/2021    Flu Vaccine Quad PF >36MO 11/09/2020    Fluzone (or Fluarix & Flulaval for VFC) >6mos 10/21/2021, 09/08/2023    Pneumococcal Conjugate 20-Valent (PCV20) 05/11/2022    Shingrix 06/02/2022, 09/08/2022    Tdap 09/08/2023       Tobacco Use: High Risk (6/28/2024)    Patient History     Smoking Tobacco Use: Every Day     Smokeless Tobacco Use: Never     Passive Exposure: Current       Objective     Physical Exam  Vitals and nursing note reviewed.   Constitutional:       Appearance: Normal appearance.   HENT:      Head: Normocephalic.      Nose: Nose normal.      Mouth/Throat:      Mouth: Mucous membranes are moist.   Eyes:      Pupils: Pupils are equal, round, and reactive to light.   Cardiovascular:      Rate and Rhythm: Normal rate and regular rhythm.      Pulses: Normal pulses.      Heart sounds: Normal heart sounds. No murmur heard.  Pulmonary:      Effort: No respiratory distress.      Breath sounds: Normal breath sounds.   Abdominal:      General:  Bowel sounds are normal. There is no distension.      Palpations: Abdomen is soft.      Tenderness: There is no abdominal tenderness.   Musculoskeletal:         General: Normal range of motion.      Cervical back: Normal range of motion and neck supple.   Skin:     General: Skin is warm and dry.      Capillary Refill: Capillary refill takes less than 2 seconds.   Neurological:      General: No focal deficit present.      Mental Status: He is alert and oriented to person, place, and time.   Psychiatric:         Mood and Affect: Mood normal.         Behavior: Behavior normal.         Thought Content: Thought content normal.         Judgment: Judgment normal.       Vitals:    06/28/24 0837   BP: 141/78   Pulse: 88   Resp: 20   Temp: 96.9 °F (36.1 °C)   SpO2: 97%   Weight: 88.9 kg (196 lb)   PainSc: 0-No pain       Wt Readings from Last 3 Encounters:   06/28/24 88.9 kg (196 lb)   04/15/24 89.4 kg (197 lb 1.5 oz)   03/08/24 92 kg (202 lb 12.8 oz)                 ECOG score: 0         ECOG: (0) Fully Active - Able to Carry On All Pre-disease Performance Without Restriction  Fall Risk Assessment was completed, and patient is at low risk for falls.  PHQ-9 Total Score: 0       The patient is  experiencing fatigue. Fatigue score: 1    PT/OT Functional Screening: PT fx screen : No needs identified  Speech Functional Screening: Speech fx screen : No needs identified  Rehab to be ordered: Rehab to be ordered : No needs identified        Result Review :  The following data was reviewed by: CHANTAL Rivero on 06/28/2024:  Lab Results   Component Value Date    HGB 15.6 06/26/2024    HCT 45.1 06/26/2024    MCV 94.4 06/26/2024     06/26/2024    WBC 13.23 (H) 06/26/2024    NEUTROABS 8.82 (H) 06/26/2024    LYMPHSABS 3.24 (H) 06/26/2024    MONOSABS 0.89 06/26/2024    EOSABS 0.20 06/26/2024    BASOSABS 0.05 06/26/2024     Lab Results   Component Value Date    GLUCOSE 110 (H) 02/29/2024    BUN 13 02/29/2024     "CREATININE 0.70 (L) 02/29/2024     (L) 02/29/2024    K 4.0 02/29/2024     02/29/2024    CO2 25.6 02/29/2024    CALCIUM 9.6 02/29/2024    PROTEINTOT 7.8 02/29/2024    ALBUMIN 4.7 02/29/2024    BILITOT 0.5 02/29/2024    ALKPHOS 91 02/29/2024    AST 15 02/29/2024    ALT 38 02/29/2024     Lab Results   Component Value Date     (L) 02/29/2024     No results found for: \"IRON\", \"LABIRON\", \"TRANSFERRIN\", \"TIBC\"  Lab Results   Component Value Date     (L) 02/29/2024     Lab Results   Component Value Date    PSA 0.657 05/11/2022       No Images in the past 120 days found..         Assessment and Plan:  Diagnoses and all orders for this visit:    1. Leukocytosis, unspecified type (Primary)  -     Flow Cytometry, Blood; Future  -     CBC & Differential; Future  -     BCR-ABL1, CML / ALL, PCR, Quant; Future    2. Tobacco abuse    3. RUSTY (obstructive sleep apnea)    Chronic intermittent leukocytosis that waxes and wants in the range of 11 to 16 to normal level. Likely reactive and secondary to chronic tobacco abuse. Did have some atypical lymphocytes on most recent diff. Will order flow cytometry and bcr-abl testing prior to his next follow up with CBC with diff.     Discussed smoking cessation. He is not interested in this time. Continue low dose CT screening through his PCP. Last done in 10 /2023 and was normal.     I spent 20 minutes caring for Madi on this date of service. This time includes time spent by me in the following activities:preparing for the visit, reviewing tests, obtaining and/or reviewing a separately obtained history, performing a medically appropriate examination and/or evaluation , counseling and educating the patient/family/caregiver, ordering medications, tests, or procedures, referring and communicating with other health care professionals , documenting information in the medical record, and independently interpreting results and communicating that information with the " patient/family/caregiver    Patient Follow Up:  follow up 3 months with NP 1 week after labs completed.     Patient was given instructions and counseling regarding his condition or for health maintenance advice. Please see specific information pulled into the AVS if appropriate.     Khushi Fong, APRN    6/28/2024    .tob

## 2024-09-10 ENCOUNTER — OFFICE VISIT (OUTPATIENT)
Dept: FAMILY MEDICINE CLINIC | Facility: CLINIC | Age: 54
End: 2024-09-10
Payer: OTHER GOVERNMENT

## 2024-09-10 VITALS
DIASTOLIC BLOOD PRESSURE: 62 MMHG | BODY MASS INDEX: 29.57 KG/M2 | WEIGHT: 195.1 LBS | HEIGHT: 68 IN | HEART RATE: 90 BPM | TEMPERATURE: 98.2 F | SYSTOLIC BLOOD PRESSURE: 128 MMHG | OXYGEN SATURATION: 98 %

## 2024-09-10 DIAGNOSIS — F17.210 CIGARETTE SMOKER: ICD-10-CM

## 2024-09-10 DIAGNOSIS — Z00.00 ANNUAL PHYSICAL EXAM: Primary | ICD-10-CM

## 2024-09-10 DIAGNOSIS — D72.829 LEUKOCYTOSIS, UNSPECIFIED TYPE: ICD-10-CM

## 2024-09-10 DIAGNOSIS — Z12.2 SCREENING FOR LUNG CANCER: ICD-10-CM

## 2024-09-10 DIAGNOSIS — I10 PRIMARY HYPERTENSION: ICD-10-CM

## 2024-09-10 DIAGNOSIS — I25.10 CAD IN NATIVE ARTERY: ICD-10-CM

## 2024-09-10 PROCEDURE — 99214 OFFICE O/P EST MOD 30 MIN: CPT

## 2024-09-10 NOTE — PROGRESS NOTES
Chief Complaint  Chief Complaint   Patient presents with    Annual Exam    Hypertension       Subjective      Madi Rehman presents to [facility]  History of Present Illness  The patient is a 53-year-old male who presents for follow-up on his blood pressure.    He has been under the care of Hematology for leukocytosis, with his next appointment scheduled for early October 2024.  It is felt that his abnormal CBC is due to his history of smoking and sleep apnea he reports no history of cardiac issues, including heart attacks or stent placements. His hypertension is being managed with amlodipine.      [facility] He states he recently had lab work done about a month ago.    SOCIAL HISTORY  He smokes less than a pack a day for 30 years.      Objective     Medical History:  Past Medical History:   Diagnosis Date    Allergic     Hypertension     Kidney stone on left side     Seasonal allergies      Past Surgical History:   Procedure Laterality Date    COLONOSCOPY N/A 04/15/2024    Procedure: COLONOSCOPY WITH POLYPECTOMY;  Surgeon: Melquiades Malik MD;  Location: [facility] ENDOSCOPY;  Service: Gastroenterology;  Laterality: N/A;  COLON POLYP    CYSTOSCOPY W/ URETERAL STENT PLACEMENT Left 09/29/2023    Procedure: CYSTOSCOPY URETEROSCOPY RETROGRADE PYELOGRAM,BASKET STONE REMOVAL,LEFT URETERAL STENT EXCHANGE;  Surgeon: Sarah Aleman MD;  Location: [facility] OR;  Service: Urology;  Laterality: Left;    FOOT SURGERY Left     left 2nd toe removal    URETEROSCOPY LASER LITHOTRIPSY WITH STENT INSERTION Left 09/15/2023    Procedure: CYSTOSCOPY URETEROSCOPY RETROGRADE PYELOGRAM STENT INSERTION, left;  Surgeon: Sarah Aleman MD;  Location: [facility] OR;  Service: Urology;  Laterality: Left;    VASECTOMY        Social History     Tobacco Use    Smoking status: Every Day     Current packs/day: 1.00     Average packs/day: 1  "pack/day for 37.7 years (37.7 ttl pk-yrs)     Types: Cigarettes     Start date: 1/1/1987     Passive exposure: Current    Smokeless tobacco: Never   Vaping Use    Vaping status: Never Used   Substance Use Topics    Alcohol use: Yes     Alcohol/week: 6.0 standard drinks of alcohol     Types: 4 Cans of beer, 2 Drinks containing 0.5 oz of alcohol per week     Comment: OCC    Drug use: Not Currently     Family History   Problem Relation Age of Onset    Hypertension Mother     Heart disease Mother     Hypertension Father     Cancer Father     Heart disease Father     Malig Hyperthermia Neg Hx     Colon cancer Neg Hx        Medications:  Prior to Admission medications    Medication Sig Start Date End Date Taking? Authorizing Provider   albuterol sulfate  (90 Base) MCG/ACT inhaler Inhale 2 puffs Every 4 (Four) Hours As Needed for Wheezing or Shortness of Air (congestion/cough). 5/11/22  Yes Emily Moran APRN   amLODIPine (NORVASC) 10 MG tablet Take 1 tablet by mouth Daily.   Yes Provider, MD Ha   cetirizine (zyrTEC) 10 MG tablet Take 1 tablet by mouth Daily.   Yes Provider, MD Ha        Allergies:   Patient has no known allergies.    Health Maintenance Due   Topic Date Due    COVID-19 Vaccine (3 - 2023-24 season) 09/01/2024    ANNUAL PHYSICAL  09/08/2024    INFLUENZA VACCINE  08/01/2024    LUNG CANCER SCREENING  10/09/2024         Vital Signs:   /62 (BP Location: Left arm, Patient Position: Sitting, Cuff Size: Adult)   Pulse 90   Temp 98.2 °F (36.8 °C) (Oral)   Ht 172.7 cm (68\")   Wt 88.5 kg (195 lb 1.6 oz)   SpO2 98%   BMI 29.66 kg/m²     Wt Readings from Last 3 Encounters:   09/10/24 88.5 kg (195 lb 1.6 oz)   06/28/24 88.9 kg (196 lb)   04/15/24 89.4 kg (197 lb 1.5 oz)     BP Readings from Last 3 Encounters:   09/10/24 128/62   06/28/24 141/78   04/15/24 151/99       Physical Exam  Vitals reviewed.   Constitutional:       Appearance: Normal appearance. He is well-developed. "   HENT:      Head: Normocephalic and atraumatic.   Eyes:      Conjunctiva/sclera: Conjunctivae normal.      Pupils: Pupils are equal, round, and reactive to light.   Cardiovascular:      Rate and Rhythm: Normal rate and regular rhythm.      Heart sounds: No murmur heard.     No friction rub. No gallop.   Pulmonary:      Effort: Pulmonary effort is normal.      Breath sounds: Normal breath sounds. No wheezing or rhonchi.   Abdominal:      General: Bowel sounds are normal. There is no distension.      Palpations: Abdomen is soft.      Tenderness: There is no abdominal tenderness.   Skin:     General: Skin is warm and dry.   Neurological:      Mental Status: He is alert and oriented to person, place, and time.      Cranial Nerves: No cranial nerve deficit.   Psychiatric:         Mood and Affect: Mood and affect normal.         Behavior: Behavior normal.         Thought Content: Thought content normal.         Judgment: Judgment normal.       Physical Exam  Vital Signs  Blood pressure reading today is 128/62.      Result Review :    The following data was reviewed by CHANTAL Chaves on 09/10/24 at 07:35 EDT:        No Images in the past 120 days found..    Results  Imaging  Low-dose chest CT for lung cancer screening showed no suspicious lung nodule but did show some coronary artery calcification.               Assessment and Plan    Diagnoses and all orders for this visit:    1. Annual physical exam (Primary)    2. Primary hypertension    3. CAD in native artery    4. Leukocytosis, unspecified type    5. Cigarette smoker  -      CT Chest Low Dose Cancer Screening WO; Future    6. Screening for lung cancer  -      CT Chest Low Dose Cancer Screening WO; Future       Assessment & Plan  1. Hypertension.  His blood pressure is well-regulated today at 128/62 mmHg. He is currently on amlodipine. Hematology has conducted additional tests to rule out any underlying hematological disorders, with no significant  findings to date. It is suspected that his condition may be linked to his extensive smoking history. He is advised to continue his current medication regimen.    2. Smoking History.  He has a long history of smoking, currently smoking less than a pack a day for approximately 30 years. He is advised to consider smoking cessation to improve overall health and reduce the risk of cardiovascular and pulmonary diseases.    3. Coronary Artery Calcification.  A low-dose chest CT scan performed about a year ago revealed some coronary artery calcification. A repeat low-dose chest CT scan will be ordered, to be conducted after 10/09/2024. If the next scan shows worsening or significant changes, a referral to Cardiology will be made.    4. Health Maintenance.  He is due for another set of labs on 10/04/2024, followed by an appointment with hematology on 10/11/2024. A comprehensive set of labs, including a cholesterol panel and thyroid check, [facility]. He is advised to bring his [facility] lab results to the next visit. Flu shots are not yet available but will be administered once they are in stock.    5.  Leukocytosis.  Hematology has conducted additional tests to rule out any underlying hematological disorders, with no significant findings to date. It is suspected that his condition may be linked to his extensive smoking history.          Smoking Cessation:    Madi Rehman  reports that he has been smoking cigarettes. He started smoking about 37 years ago. He has a 37.7 pack-year smoking history. He has been exposed to tobacco smoke. He has never used smokeless tobacco. I have educated him on the risk of diseases from using tobacco products such as cancer, COPD, and heart disease.     I advised him to quit and he is not willing to quit.    I spent 3  minutes counseling the patient.            Follow Up   Return in about 6 months (around 3/10/2025) for Next scheduled follow up.  Patient was given instructions and  counseling regarding his condition or for health maintenance advice. Please see specific information pulled into the AVS if appropriate.     Please note that portions of this note were completed with a voice recognition program.    Patient or patient representative verbalized consent for the use of Ambient Listening during the visit with  CHANTAL Chaves for chart documentation. 9/10/2024  09:14 EDT

## 2024-09-18 ENCOUNTER — TELEPHONE (OUTPATIENT)
Dept: FAMILY MEDICINE CLINIC | Facility: CLINIC | Age: 54
End: 2024-09-18
Payer: OTHER GOVERNMENT

## 2024-10-04 ENCOUNTER — LAB (OUTPATIENT)
Dept: ONCOLOGY | Facility: HOSPITAL | Age: 54
End: 2024-10-04
Payer: OTHER GOVERNMENT

## 2024-10-04 DIAGNOSIS — D72.829 LEUKOCYTOSIS, UNSPECIFIED TYPE: ICD-10-CM

## 2024-10-04 LAB
BASOPHILS # BLD AUTO: 0.04 10*3/MM3 (ref 0–0.2)
BASOPHILS NFR BLD AUTO: 0.4 % (ref 0–1.5)
DEPRECATED RDW RBC AUTO: 48.7 FL (ref 37–54)
EOSINOPHIL # BLD AUTO: 0.17 10*3/MM3 (ref 0–0.4)
EOSINOPHIL NFR BLD AUTO: 1.8 % (ref 0.3–6.2)
ERYTHROCYTE [DISTWIDTH] IN BLOOD BY AUTOMATED COUNT: 13.4 % (ref 12.3–15.4)
HCT VFR BLD AUTO: 48.4 % (ref 37.5–51)
HGB BLD-MCNC: 16.4 G/DL (ref 13–17.7)
IMM GRANULOCYTES # BLD AUTO: 0.02 10*3/MM3 (ref 0–0.05)
IMM GRANULOCYTES NFR BLD AUTO: 0.2 % (ref 0–0.5)
LYMPHOCYTES # BLD AUTO: 2.75 10*3/MM3 (ref 0.7–3.1)
LYMPHOCYTES NFR BLD AUTO: 29.3 % (ref 19.6–45.3)
MCH RBC QN AUTO: 32.6 PG (ref 26.6–33)
MCHC RBC AUTO-ENTMCNC: 33.9 G/DL (ref 31.5–35.7)
MCV RBC AUTO: 96.2 FL (ref 79–97)
MONOCYTES # BLD AUTO: 0.65 10*3/MM3 (ref 0.1–0.9)
MONOCYTES NFR BLD AUTO: 6.9 % (ref 5–12)
NEUTROPHILS NFR BLD AUTO: 5.75 10*3/MM3 (ref 1.7–7)
NEUTROPHILS NFR BLD AUTO: 61.4 % (ref 42.7–76)
PLATELET # BLD AUTO: 223 10*3/MM3 (ref 140–450)
PMV BLD AUTO: 9.7 FL (ref 6–12)
RBC # BLD AUTO: 5.03 10*6/MM3 (ref 4.14–5.8)
WBC NRBC COR # BLD AUTO: 9.38 10*3/MM3 (ref 3.4–10.8)

## 2024-10-04 PROCEDURE — 36415 COLL VENOUS BLD VENIPUNCTURE: CPT

## 2024-10-04 PROCEDURE — 85025 COMPLETE CBC W/AUTO DIFF WBC: CPT

## 2024-10-07 LAB — Lab: NORMAL

## 2024-10-10 ENCOUNTER — HOSPITAL ENCOUNTER (OUTPATIENT)
Dept: CT IMAGING | Facility: HOSPITAL | Age: 54
Discharge: HOME | End: 2024-10-10
Payer: OTHER GOVERNMENT

## 2024-10-10 DIAGNOSIS — F17.210 CIGARETTE SMOKER: ICD-10-CM

## 2024-10-10 DIAGNOSIS — Z12.2 SCREENING FOR LUNG CANCER: ICD-10-CM

## 2024-10-10 PROCEDURE — 71271 CT THORAX LUNG CANCER SCR C-: CPT

## 2024-10-11 ENCOUNTER — OFFICE VISIT (OUTPATIENT)
Dept: ONCOLOGY | Facility: HOSPITAL | Age: 54
End: 2024-10-11
Payer: OTHER GOVERNMENT

## 2024-10-11 VITALS
RESPIRATION RATE: 16 BRPM | BODY MASS INDEX: 30.31 KG/M2 | TEMPERATURE: 97.4 F | HEART RATE: 91 BPM | HEIGHT: 68 IN | SYSTOLIC BLOOD PRESSURE: 141 MMHG | WEIGHT: 200 LBS | DIASTOLIC BLOOD PRESSURE: 75 MMHG | OXYGEN SATURATION: 96 %

## 2024-10-11 DIAGNOSIS — D72.829 LEUKOCYTOSIS, UNSPECIFIED TYPE: Primary | ICD-10-CM

## 2024-10-11 DIAGNOSIS — Z72.0 TOBACCO ABUSE: ICD-10-CM

## 2024-10-11 NOTE — PROGRESS NOTES
Chief Complaint/Reason for Referral:  Follow-up (Elevated white blood cell count, unspecified- 3 MO FOLLOW UP)    [place],*  [place], APRN        Subjective    History of Present Illness    Mr. Madi Rehman seen in follow up for leukocytosis. Seen initially in February of this year. Reports he is still smoking. He is not interested in smoking cessation at this time. Reports he has RUSTY and uses CPAP.     WBC ranges from normal to 16.21 that waxes and wanes. Likely secondary to chronic tobacco abuse. Most recent lab on 6/26/2024 with WBC of 13.23, normal hemoglobin and normal platelet count. Differential did show some atypical lymphocytes with absolute neutrophilia and absolute lymphocytosis. He denies any weight loss, lymphadenopathy, fevers, chills or recurring infections. Reports leg swelling. He is receiving low dose CT scans for history of chronic tobacco abuse. Last done in October of 23 and did not show any pulmonary nodules.     10/11/2024: interval follow up: Mr. Madi Rehman presents for follow-up today for leukocytosis.  He continues to smoke and is not interested in smoking cessation at this time.  Reports he had a low-dose CT screening yesterday.  Results of the low-dose CT screening are not completed.  Recently had blood work done on 10/4/2024.  CBC is normal with a normal white blood cell count of 9.38, normal hemoglobin and hematocrit, and normal platelet.  Flow cytometry did not show any abnormalities.         Oncology/Hematology History    No history exists.    2/29/2024 consult:     History of Present Illness  Mr. Madi Rehman is a 53 year old  male presenting for new patient evaluation for leukocytosis.      PMH includes: tobacco abuse of 1 PPD for 35 years, prior kidney stones, obesity, seasonal allergies, hypertension. He has quit smoking in the past for about a 1 year, but no cessation recently. We discussed smoking cessation briefly. He denies any  chronic or acute infections, lymphadenopathy or weight loss. Denies any recent antibiotic use. He is receiving low dose CT scans for history of tobacco use and was last done in October of 2023 and did not show any suspicious nodules. Follows with urology ( Dr. Caballero) for kidney stones and last stone was in November of 2023.     Lab work shows he had had intermittent elevated WBC count since at least April of 2020 in the range of 10.40 which was normal in May of 2022 to as high as 16.21 on 9/8/2023. WBC wax and wane in the range of 10.40 to 16.21 but baseline seems to be in the 11-11 range. Differential shows mostly neutrophils but today labs is elevated with neutrophils and lymphocytes up to 3580 and elevated monocytes up to 970.           Review of Systems   Constitutional:  Positive for fatigue. Negative for fever and unexpected weight loss.   HENT: Negative.  Negative for sore throat, swollen glands and trouble swallowing.    Eyes: Negative.    Respiratory: Negative.     Cardiovascular:  Positive for leg swelling (IN ANKLE LEFT).   Gastrointestinal: Negative.    Endocrine: Negative.    Genitourinary: Negative.    Musculoskeletal: Negative.    Skin: Negative.    Allergic/Immunologic: Negative.    Neurological: Negative.    Hematological: Negative.    Psychiatric/Behavioral: Negative.     All other systems reviewed and are negative.      Current Outpatient Medications on File Prior to Visit   Medication Sig Dispense Refill    albuterol sulfate  (90 Base) MCG/ACT inhaler Inhale 2 puffs Every 4 (Four) Hours As Needed for Wheezing or Shortness of Air (congestion/cough). 8 g 5    amLODIPine (NORVASC) 10 MG tablet Take 1 tablet by mouth Daily.      cetirizine (zyrTEC) 10 MG tablet Take 1 tablet by mouth Daily.       No current facility-administered medications on file prior to visit.       No Known Allergies  Past Medical History:   Diagnosis Date    Allergic     Hypertension     Kidney stone on left side      Seasonal allergies      Past Surgical History:   Procedure Laterality Date    COLONOSCOPY N/A 04/15/2024    Procedure: COLONOSCOPY WITH POLYPECTOMY;  Surgeon: Melquiades Malik MD;  Location: [facility] ENDOSCOPY;  Service: Gastroenterology;  Laterality: N/A;  COLON POLYP    CYSTOSCOPY W/ URETERAL STENT PLACEMENT Left 09/29/2023    Procedure: CYSTOSCOPY URETEROSCOPY RETROGRADE PYELOGRAM,BASKET STONE REMOVAL,LEFT URETERAL STENT EXCHANGE;  Surgeon: Sarah Aleman MD;  Location: [facility] MAIN OR;  Service: Urology;  Laterality: Left;    FOOT SURGERY Left     left 2nd toe removal    URETEROSCOPY LASER LITHOTRIPSY WITH STENT INSERTION Left 09/15/2023    Procedure: CYSTOSCOPY URETEROSCOPY RETROGRADE PYELOGRAM STENT INSERTION, left;  Surgeon: Sarah Aleman MD;  Location: [facility] MAIN OR;  Service: Urology;  Laterality: Left;    VASECTOMY       Social History     Socioeconomic History    Marital status: Single   Tobacco Use    Smoking status: Every Day     Current packs/day: 1.00     Average packs/day: 1 pack/day for 37.8 years (37.8 ttl pk-yrs)     Types: Cigarettes     Start date: 1/1/1987     Passive exposure: Current    Smokeless tobacco: Never   Vaping Use    Vaping status: Never Used   Substance and Sexual Activity    Alcohol use: Yes     Alcohol/week: 6.0 standard drinks of alcohol     Types: 4 Cans of beer, 2 Drinks containing 0.5 oz of alcohol per week     Comment: OCC    Drug use: Not Currently    Sexual activity: Yes     Partners: Female     Birth control/protection: None     Family History   Problem Relation Age of Onset    Hypertension Mother     Heart disease Mother     Hypertension Father     Cancer Father     Heart disease Father     Malig Hyperthermia Neg Hx     Colon cancer Neg Hx      Immunization History   Administered Date(s) Administered    COVID-19 (Coull) 03/10/2021    COVID-19 (PFIZER) Purple Cap Monovalent 11/15/2021    Flu Vaccine Quad PF >36MO 11/09/2020    Fluzone (or Fluarix & Flulaval  "for VFC) >6mos 10/21/2021, 09/08/2023    Pneumococcal Conjugate 20-Valent (PCV20) 05/11/2022    Shingrix 06/02/2022, 09/08/2022    Tdap 09/08/2023       Tobacco Use: High Risk (10/11/2024)    Patient History     Smoking Tobacco Use: Every Day     Smokeless Tobacco Use: Never     Passive Exposure: Current       Objective     Physical Exam  Vitals and nursing note reviewed.   Constitutional:       Appearance: Normal appearance.   HENT:      Head: Normocephalic.      Nose: Nose normal.      Mouth/Throat:      Mouth: Mucous membranes are moist.   Eyes:      Pupils: Pupils are equal, round, and reactive to light.   Cardiovascular:      Rate and Rhythm: Normal rate and regular rhythm.      Pulses: Normal pulses.      Heart sounds: Normal heart sounds. No murmur heard.  Pulmonary:      Effort: No respiratory distress.      Breath sounds: Normal breath sounds.   Abdominal:      General: Bowel sounds are normal. There is no distension.      Palpations: Abdomen is soft.      Tenderness: There is no abdominal tenderness.   Musculoskeletal:         General: Normal range of motion.      Cervical back: Normal range of motion and neck supple.   Skin:     General: Skin is warm and dry.      Capillary Refill: Capillary refill takes less than 2 seconds.   Neurological:      General: No focal deficit present.      Mental Status: He is alert and oriented to person, place, and time.   Psychiatric:         Mood and Affect: Mood normal.         Behavior: Behavior normal.         Thought Content: Thought content normal.         Judgment: Judgment normal.         Vitals:    10/11/24 0827   BP: 141/75   Pulse: 91   Resp: 16   Temp: 97.4 °F (36.3 °C)   TempSrc: Temporal   SpO2: 96%   Weight: 90.7 kg (200 lb)   Height: 172.7 cm (68\")   PainSc: 0-No pain       Wt Readings from Last 3 Encounters:   10/11/24 90.7 kg (200 lb)   09/10/24 88.5 kg (195 lb 1.6 oz)   06/28/24 88.9 kg (196 lb)        ECOG score: 0         ECOG: (0) Fully Active - Able " "to Carry On All Pre-disease Performance Without Restriction  Fall Risk Assessment was completed, and patient is at low risk for falls.  PHQ-9 Total Score: 0       The patient is  experiencing fatigue. Fatigue score: 1    PT/OT Functional Screening: PT fx screen : No needs identified  Speech Functional Screening: Speech fx screen : No needs identified  Rehab to be ordered: Rehab to be ordered : No needs identified        Result Review :  The following data was reviewed by: CHANTAL Rivero on 06/28/2024:  Lab Results   Component Value Date    HGB 16.4 10/04/2024    HCT 48.4 10/04/2024    MCV 96.2 10/04/2024     10/04/2024    WBC 9.38 10/04/2024    NEUTROABS 5.75 10/04/2024    LYMPHSABS 2.75 10/04/2024    MONOSABS 0.65 10/04/2024    EOSABS 0.17 10/04/2024    BASOSABS 0.04 10/04/2024     Lab Results   Component Value Date    GLUCOSE 110 (H) 02/29/2024    BUN 13 02/29/2024    CREATININE 0.70 (L) 02/29/2024     (L) 02/29/2024    K 4.0 02/29/2024     02/29/2024    CO2 25.6 02/29/2024    CALCIUM 9.6 02/29/2024    PROTEINTOT 7.8 02/29/2024    ALBUMIN 4.7 02/29/2024    BILITOT 0.5 02/29/2024    ALKPHOS 91 02/29/2024    AST 15 02/29/2024    ALT 38 02/29/2024     Lab Results   Component Value Date     (L) 02/29/2024     No results found for: \"IRON\", \"LABIRON\", \"TRANSFERRIN\", \"TIBC\"  Lab Results   Component Value Date     (L) 02/29/2024     Lab Results   Component Value Date    PSA 0.657 05/11/2022       No Images in the past 120 days found..         Assessment and Plan:  Diagnoses and all orders for this visit:    1. Leukocytosis, unspecified type (Primary)    2. Tobacco abuse      Chronic intermittent leukocytosis that waxes and wants in the range of 11 to 16 to normal level. Likely reactive and secondary to chronic tobacco abuse. Did have some atypical lymphocytes on most recent diff.  CBC on 10 4 is normal.  Flow cytometry is normal.  BCR-ABL is pending..  Will call with these " results once they are available.  Doubt there is any concern for CML since his white blood cell is normal.     Discussed smoking cessation. He is not interested in this time. Continue low dose CT screening through his PCP. Last done in 10 /2023 and was normal.     We will plan to discharge him back to his primary care provider,  CHANTAL Chaves for monitoring of his blood counts.  If he continues to have increased white blood cell count, then consider rereferral if needed, but the leukocytosis is likely related to his chronic tobacco use.     I spent 20 minutes caring for Madi on this date of service. This time includes time spent by me in the following activities:preparing for the visit, reviewing tests, obtaining and/or reviewing a separately obtained history, performing a medically appropriate examination and/or evaluation , counseling and educating the patient/family/caregiver, ordering medications, tests, or procedures, referring and communicating with other health care professionals , documenting information in the medical record, and independently interpreting results and communicating that information with the patient/family/caregiver    Patient Follow Up:  follow up 3 months with NP 1 week after labs completed.     Patient was given instructions and counseling regarding his condition or for health maintenance advice. Please see specific information pulled into the AVS if appropriate.     CHANTAL Rivero    10/11/2024    .tob

## 2024-10-12 LAB
INTERPRETATION: NEGATIVE
LAB DIRECTOR NAME PROVIDER: NORMAL
LABORATORY COMMENT REPORT: NORMAL
REF LAB TEST METHOD: NORMAL
T(ABL1,BCR)B2A2/CONTROL BLD/T: NORMAL %
T(ABL1,BCR)B3A2/CONTROL BLD/T: NORMAL %
T(ABL1,BCR)E1A2/CONTROL BLD/T: NORMAL %

## 2024-10-21 ENCOUNTER — TELEPHONE (OUTPATIENT)
Dept: FAMILY MEDICINE CLINIC | Facility: CLINIC | Age: 54
End: 2024-10-21

## 2024-10-22 DIAGNOSIS — I25.10 CORONARY ARTERY DISEASE INVOLVING NATIVE CORONARY ARTERY OF NATIVE HEART WITHOUT ANGINA PECTORIS: Primary | ICD-10-CM

## 2024-10-22 DIAGNOSIS — K76.0 HEPATIC STEATOSIS: ICD-10-CM

## 2024-10-22 DIAGNOSIS — F17.210 CIGARETTE SMOKER: ICD-10-CM

## 2024-10-22 DIAGNOSIS — I10 PRIMARY HYPERTENSION: ICD-10-CM

## 2024-11-08 ENCOUNTER — OFFICE VISIT (OUTPATIENT)
Dept: CARDIOLOGY | Facility: CLINIC | Age: 54
End: 2024-11-08
Payer: OTHER GOVERNMENT

## 2024-11-08 VITALS
HEIGHT: 68 IN | DIASTOLIC BLOOD PRESSURE: 80 MMHG | WEIGHT: 200 LBS | BODY MASS INDEX: 30.31 KG/M2 | SYSTOLIC BLOOD PRESSURE: 140 MMHG | HEART RATE: 80 BPM

## 2024-11-08 DIAGNOSIS — E78.2 MIXED HYPERLIPIDEMIA: Primary | ICD-10-CM

## 2024-11-08 DIAGNOSIS — I25.10 CORONARY ARTERY CALCIFICATION SEEN ON CAT SCAN: ICD-10-CM

## 2024-11-08 DIAGNOSIS — I10 ESSENTIAL HYPERTENSION: ICD-10-CM

## 2024-11-08 PROCEDURE — 99204 OFFICE O/P NEW MOD 45 MIN: CPT | Performed by: INTERNAL MEDICINE

## 2024-11-08 RX ORDER — ROSUVASTATIN CALCIUM 5 MG/1
5 TABLET, COATED ORAL DAILY
Qty: 30 TABLET | Refills: 11 | Status: SHIPPED | OUTPATIENT
Start: 2024-11-08

## 2024-11-08 NOTE — PROGRESS NOTES
CARDIOLOGY INITIAL CONSULT       Chief Complaint  Coronary Artery Disease and Hypertension    Subjective            Madi Rehman presents to [facility] CARDIOLOGY  History of Present Illness  The patient is referred for cardiovascular evaluation.  He had a recent low-dose chest CT which showed extensive coronary calcification.  The patient is an active smoker for over 35 years about half a pack per day.  He has family history of coronary artery disease both parents before the age of 60.  He has past medical history of essential hypertension and hyperlipidemia but not on a statin therapy.  His last LDL was 145 with HDL of 35.  He denies angina or dyspnea.  EKG showed normal sinus rhythm.       Past History:    Medical History:  Past Medical History:   Diagnosis Date    Allergic     Hypertension     Kidney stone on left side     Seasonal allergies     Sleep apnea [id number]       Surgical History: has a past surgical history that includes Foot surgery (Left); ureteroscopy laser lithotripsy with stent insertion (Left, 09/15/2023); Cystoscopy w/ ureteral stent placement (Left, 09/29/2023); Colonoscopy (N/A, 04/15/2024); Vasectomy; and Amputation.     Family History: family history includes Cancer in his father; Heart attack in his father; Heart disease in his father and mother; Hypertension in his father and mother.     Social History: reports that he has been smoking cigarettes. He started smoking about 37 years ago. He has a 37.9 pack-year smoking history. He has been exposed to tobacco smoke. He has never used smokeless tobacco. He reports current alcohol use of about 6.0 standard drinks of alcohol per week. He reports that he does not currently use drugs.    Allergies: Patient has no known allergies.    Current Outpatient Medications on File Prior to Visit   Medication Sig    albuterol sulfate  (90 Base) MCG/ACT inhaler Inhale 2 puffs Every 4 (Four) Hours As Needed for Wheezing or Shortness  "of Air (congestion/cough).    amLODIPine (NORVASC) 10 MG tablet Take 1 tablet by mouth Daily.    cetirizine (zyrTEC) 10 MG tablet Take 1 tablet by mouth Daily.     No current facility-administered medications on file prior to visit.          Review of Systems : All systems were reviewed and negative    Objective     /80 (BP Location: Left arm)   Pulse 80   Ht 172.7 cm (68\")   Wt 90.7 kg (200 lb)   BMI 30.41 kg/m²       Physical Exam  Alert, oriented  Neck: No JVD, no bruits  Heart. Regular, no gallops, no rubs, no murmurs.  Lungs: no rales, no wheezing.   Abdomen: soft, bs +  LE: No edema  Neurologic: No motor deficits      Result Review :     The following data was reviewed by: Douglas Yu MD on 11/08/2024:    CMP          2/29/2024    09:46   CMP   Glucose 110    BUN 13    Creatinine 0.70    EGFR 109.5    Sodium 135    Potassium 4.0    Chloride 102    Calcium 9.6    Total Protein 7.8    Albumin 4.7    Globulin 3.1    Total Bilirubin 0.5    Alkaline Phosphatase 91    AST (SGOT) 15    ALT (SGPT) 38    Albumin/Globulin Ratio 1.5    BUN/Creatinine Ratio 18.6    Anion Gap 7.4      CBC          2/29/2024    09:46 6/26/2024    12:35 10/4/2024    08:31   CBC   WBC 11.93  13.23  9.38    RBC 5.11  4.78  5.03    Hemoglobin 16.7  15.6  16.4    Hematocrit 48.6  45.1  48.4    MCV 95.1  94.4  96.2    MCH 32.7  32.6  32.6    MCHC 34.4  34.6  33.9    RDW 12.9  13.4  13.4    Platelets 266  245  223             Data reviewed: Cardiology studies                     Assessment and Plan        Diagnoses and all orders for this visit:    1. Mixed hyperlipidemia (Primary)  -     Lipid Panel; Future    2. Essential hypertension  -     Adult Transthoracic Echo Complete W/ Cont if Necessary Per Protocol; Future    3. Coronary artery calcification seen on CAT scan    Other orders  -     rosuvastatin (CRESTOR) 5 MG tablet; Take 1 tablet by mouth Daily.  Dispense: 30 tablet; Refill: 11        The patient has a 10 Year ASCVD risk " for fatal and non fatal MI/stroke of 19.6 %.  I advised him to initiate lifestyle modification and heart healthy diet.  He was counseled to quit smoking.  I would start rosuvastatin 5 mg oral daily and recheck lipid profile in 6 weeks.  The goal LDL should be less than 70 mg/dL and  TG /less than 100 mg/dL.  Continue with blood pressure control for goal less than 120/80 mmHG.  I would obtain a 2D echocardiogram to assess cardiac function and wall motion.  Advised to walk at least 45 minutes/day 5 times per week.  Will reevaluate in 6 weeks    I spent 45 minutes caring for Madi on this date of service. This time includes time spent by me in the following activities:reviewing tests, obtaining and/or reviewing a separately obtained history, performing a medically appropriate examination and/or evaluation , ordering medications, tests, or procedures, and documenting information in the medical record.    Madi Rehman  reports that he has been smoking cigarettes. He started smoking about 37 years ago. He has a 37.9 pack-year smoking history. He has been exposed to tobacco smoke. He has never used smokeless tobacco. I have educated him on the risk of diseases from using tobacco products such as cancer, COPD and heart disease.     I advised him to quit and he is willing to quit.    I spend 10 minutes on counseling the patient.    Follow Up     Return in about 6 weeks (around 12/20/2024) for LBunch, After testing.    Patient was given instructions and counseling regarding his condition or for health maintenance advice. Please see specific information pulled into the AVS if appropriate.

## 2024-11-22 ENCOUNTER — HOSPITAL ENCOUNTER (OUTPATIENT)
Dept: CARDIOLOGY | Facility: HOSPITAL | Age: 54
Discharge: HOME | End: 2024-11-22
Admitting: INTERNAL MEDICINE
Payer: OTHER GOVERNMENT

## 2024-11-22 DIAGNOSIS — I10 ESSENTIAL HYPERTENSION: ICD-10-CM

## 2024-11-22 LAB
AORTIC DIMENSIONLESS INDEX: 0.88 (DI)
ASCENDING AORTA: 2.6 CM
BH CV ECHO MEAS - AO MAX PG: 5.6 MMHG
BH CV ECHO MEAS - AO MEAN PG: 2.9 MMHG
BH CV ECHO MEAS - AO ROOT DIAM: 2.8 CM
BH CV ECHO MEAS - AO V2 MAX: 118.5 CM/SEC
BH CV ECHO MEAS - AO V2 VTI: 24.1 CM
BH CV ECHO MEAS - EF(MOD-BP): 61.8 %
BH CV ECHO MEAS - EF(MOD-SP2): 61.3 %
BH CV ECHO MEAS - EF(MOD-SP4): 61.5 %
BH CV ECHO MEAS - FS: 31 %
BH CV ECHO MEAS - IVS/LVPW: 1.09 CM
BH CV ECHO MEAS - IVSD: 1.2 CM
BH CV ECHO MEAS - LA DIMENSION: 3.5 CM
BH CV ECHO MEAS - LAT PEAK E' VEL: 13.4 CM/SEC
BH CV ECHO MEAS - LV MAX PG: 4 MMHG
BH CV ECHO MEAS - LV MEAN PG: 2 MMHG
BH CV ECHO MEAS - LV V1 MAX: 100 CM/SEC
BH CV ECHO MEAS - LV V1 VTI: 21.4 CM
BH CV ECHO MEAS - LVIDD: 4.9 CM
BH CV ECHO MEAS - LVIDS: 3.4 CM
BH CV ECHO MEAS - LVOT DIAM: 2.1 CM
BH CV ECHO MEAS - LVPWD: 1.1 CM
BH CV ECHO MEAS - MED PEAK E' VEL: 11.2 CM/SEC
BH CV ECHO MEAS - MV A MAX VEL: 68.6 CM/SEC
BH CV ECHO MEAS - MV E MAX VEL: 92.7 CM/SEC
BH CV ECHO MEAS - MV E/A: 1.35
BH CV ECHO MEAS - RVDD: 2.11 CM
BH CV ECHO MEAS - TAPSE (>1.6): 2.29 CM
BH CV ECHO MEASUREMENTS AVERAGE E/E' RATIO: 7.54
IVRT: 53 MS

## 2024-11-22 PROCEDURE — 93306 TTE W/DOPPLER COMPLETE: CPT

## 2024-12-02 ENCOUNTER — TELEPHONE (OUTPATIENT)
Dept: CARDIOLOGY | Facility: CLINIC | Age: 54
End: 2024-12-02
Payer: OTHER GOVERNMENT

## 2024-12-06 ENCOUNTER — LAB (OUTPATIENT)
Facility: HOSPITAL | Age: 54
End: 2024-12-06
Payer: OTHER GOVERNMENT

## 2024-12-06 DIAGNOSIS — E78.2 MIXED HYPERLIPIDEMIA: ICD-10-CM

## 2024-12-06 LAB
CHOLEST SERPL-MCNC: 142 MG/DL (ref 0–200)
HDLC SERPL-MCNC: 27 MG/DL (ref 40–60)
LDLC SERPL CALC-MCNC: 99 MG/DL (ref 0–100)
LDLC/HDLC SERPL: 3.64 {RATIO}
TRIGL SERPL-MCNC: 83 MG/DL (ref 0–150)
VLDLC SERPL-MCNC: 16 MG/DL (ref 5–40)

## 2024-12-06 PROCEDURE — 36415 COLL VENOUS BLD VENIPUNCTURE: CPT

## 2024-12-06 PROCEDURE — 80061 LIPID PANEL: CPT

## 2024-12-17 ENCOUNTER — TELEPHONE (OUTPATIENT)
Dept: CARDIOLOGY | Facility: CLINIC | Age: 54
End: 2024-12-17
Payer: OTHER GOVERNMENT

## 2024-12-17 NOTE — TELEPHONE ENCOUNTER
----- Message from Smitha Chew sent at 12/16/2024  9:37 PM EST -----  Recent labs show cholesterol levels are improving, but not quite at goal.  Recommend increasing dose of rosuvastatin from 5 mg nightly to 2 mg nightly.

## 2024-12-18 RX ORDER — ROSUVASTATIN CALCIUM 10 MG/1
10 TABLET, COATED ORAL DAILY
Qty: 90 TABLET | Refills: 3 | Status: SHIPPED | OUTPATIENT
Start: 2024-12-18

## 2024-12-18 NOTE — TELEPHONE ENCOUNTER
SW patient regarding results and recommendations. Voiced understanding.   Per Smitha Jewell to increase to 10 mg,

## 2025-01-10 ENCOUNTER — OFFICE VISIT (OUTPATIENT)
Dept: CARDIOLOGY | Facility: CLINIC | Age: 55
End: 2025-01-10
Payer: OTHER GOVERNMENT

## 2025-01-10 VITALS
DIASTOLIC BLOOD PRESSURE: 74 MMHG | SYSTOLIC BLOOD PRESSURE: 136 MMHG | BODY MASS INDEX: 31.98 KG/M2 | WEIGHT: 211 LBS | HEART RATE: 84 BPM | HEIGHT: 68 IN

## 2025-01-10 DIAGNOSIS — E78.2 MIXED HYPERLIPIDEMIA: ICD-10-CM

## 2025-01-10 DIAGNOSIS — I25.10 CORONARY ARTERY CALCIFICATION SEEN ON CAT SCAN: ICD-10-CM

## 2025-01-10 DIAGNOSIS — I10 ESSENTIAL HYPERTENSION: Primary | ICD-10-CM

## 2025-01-10 PROCEDURE — 99214 OFFICE O/P EST MOD 30 MIN: CPT | Performed by: FAMILY MEDICINE

## 2025-01-10 NOTE — PROGRESS NOTES
Chief Complaint  Follow-up    Subjective        History of Present Illness  Madi Rehman presents to [facility] CARDIOLOGY   Mr. Rehman is a 54-year-old male patient coming in today for cardiac follow-up.  Past medical history of hypertension, coronary artery calcifications on CT, and hyperlipidemia and smoking.  Since last visit he underwent echocardiogram showing normal LV systolic function with a EF of 61 to 65%, and mild LVH with grade 1 impaired relaxation.  His recent labs showed improvement in his cholesterol levels, but he was still not at goal, and dose of rosuvastatin was increased from 5 mg to 10 mg nightly.  Tolerating medications well.  He reports he is trying to increase his physical activity, he does get become very minorly short of breath, but he feels like this is more related to deconditioning as he has also been having some chronic back pain which limits his activity.    Past Medical History:   Diagnosis Date    Allergic     Hypertension     Kidney stone on left side     Seasonal allergies     Sleep apnea [id number]       No Known Allergies     Past Surgical History:   Procedure Laterality Date    AMPUTATION      COLONOSCOPY N/A 04/15/2024    Procedure: COLONOSCOPY WITH POLYPECTOMY;  Surgeon: Melquiades Malik MD;  Location: [facility] ENDOSCOPY;  Service: Gastroenterology;  Laterality: N/A;  COLON POLYP    CYSTOSCOPY W/ URETERAL STENT PLACEMENT Left 09/29/2023    Procedure: CYSTOSCOPY URETEROSCOPY RETROGRADE PYELOGRAM,BASKET STONE REMOVAL,LEFT URETERAL STENT EXCHANGE;  Surgeon: Sarah Aleman MD;  Location: [facility] OR;  Service: Urology;  Laterality: Left;    FOOT SURGERY Left     left 2nd toe removal    URETEROSCOPY LASER LITHOTRIPSY WITH STENT INSERTION Left 09/15/2023    Procedure: CYSTOSCOPY URETEROSCOPY RETROGRADE PYELOGRAM STENT INSERTION, left;  Surgeon: Sarah Aleman MD;  Location: [facility] OR;  Service: Urology;  Laterality: Left;    VASECTOMY       "    Social History  He  reports that he has been smoking cigarettes. He started smoking about 38 years ago. He has a 38 pack-year smoking history. He has been exposed to tobacco smoke. He has never used smokeless tobacco. He reports current alcohol use of about 6.0 standard drinks of alcohol per week. He reports that he does not currently use drugs.    Family History  His family history includes Cancer in his father; Heart attack in his father; Heart disease in his father and mother; Hypertension in his father and mother.       Current Outpatient Medications on File Prior to Visit   Medication Sig    albuterol sulfate  (90 Base) MCG/ACT inhaler Inhale 2 puffs Every 4 (Four) Hours As Needed for Wheezing or Shortness of Air (congestion/cough).    amLODIPine (NORVASC) 10 MG tablet Take 1 tablet by mouth Daily.    cetirizine (zyrTEC) 10 MG tablet Take 1 tablet by mouth Daily.    rosuvastatin (CRESTOR) 10 MG tablet Take 1 tablet by mouth Daily.     No current facility-administered medications on file prior to visit.         Review of Systems   See HPI      Objective   Vitals:    01/10/25 0757   BP: 136/74   Pulse: 84   Weight: 95.7 kg (211 lb)   Height: 172.7 cm (68\")         Physical Exam  General : Alert, awake, no acute distress  Neck : Supple, no carotid bruit, no jugular venous distention  CVS : Regular rate and rhythm, no murmur, no rubs or gallops  Lungs: Clear to auscultation bilaterally, no crackles or rhonchi  Abdomen: Soft, nontender, bowel sounds active  Extremities: Warm, well-perfused, no pedal edema      Result Review     The following data was reviewed by Smitha Chew, CHANTAL  No results found for: \"PROBNP\"  CMP          2/29/2024    09:46   CMP   Glucose 110    BUN 13    Creatinine 0.70    EGFR 109.5    Sodium 135    Potassium 4.0    Chloride 102    Calcium 9.6    Total Protein 7.8    Albumin 4.7    Globulin 3.1    Total Bilirubin 0.5    Alkaline Phosphatase 91    AST (SGOT) 15    ALT (SGPT) 38  " "  Albumin/Globulin Ratio 1.5    BUN/Creatinine Ratio 18.6    Anion Gap 7.4      CBC w/diff          2/29/2024    09:46 6/26/2024    12:35 10/4/2024    08:31   CBC w/Diff   WBC 11.93  13.23  9.38    RBC 5.11  4.78  5.03    Hemoglobin 16.7  15.6  16.4    Hematocrit 48.6  45.1  48.4    MCV 95.1  94.4  96.2    MCH 32.7  32.6  32.6    MCHC 34.4  34.6  33.9    RDW 12.9  13.4  13.4    Platelets 266  245  223    Neutrophil Rel % 65.0  66.7  61.4    Immature Granulocyte Rel % 0.3  0.2  0.2    Lymphocyte Rel % 24.5  24.5  29.3    Monocyte Rel % 8.1  6.7  6.9    Eosinophil Rel % 1.7  1.5  1.8    Basophil Rel % 0.4  0.4  0.4       Lab Results   Component Value Date    TSH 2.240 05/11/2022      No results found for: \"FREET4\"   No results found for: \"DDIMERQUANT\"  No results found for: \"MG\"   No results found for: \"DIGOXIN\"   No results found for: \"TROPONINT\"        Lipid Panel          12/6/2024    09:45   Lipid Panel   Total Cholesterol 142    Triglycerides 83    HDL Cholesterol 27    VLDL Cholesterol 16    LDL Cholesterol  99    LDL/HDL Ratio 3.64          Results for orders placed during the hospital encounter of 11/22/24    Adult Transthoracic Echo Complete W/ Cont if Necessary Per Protocol    Interpretation Summary    Left ventricular ejection fraction appears to be 61 - 65%.    Left ventricular wall thickness is consistent with mild concentric hypertrophy.    Left ventricular diastolic function is consistent with (grade I) impaired relaxation.             Assessment and Plan   Diagnoses and all orders for this visit:    1. Essential hypertension (Primary)    2. Coronary artery calcification seen on CAT scan    3. Mixed hyperlipidemia  -     Comprehensive Metabolic Panel; Future  -     Lipid Panel; Future      1.  Hypertension-blood pressure is well-controlled, continue current dose amlodipine.  If he has any increase in blood pressure in the future, would consider adding a beta-blocker, as he has some mild LVH and grade 1 " impaired relaxation on echocardiogram.    Coronary artery calcifications-he is stable without any symptoms of angina recommend daily aspirin, and aggressive risk factor modification including tight control of blood pressure and cholesterol, increase physical activity.  If he becomes symptomatic, we can consider functional testing such as stress test.    Hyperlipidemia-most recent LDL is 99, dose of rosuvastatin titrated up, we will recheck fasting lipid profile again in approximately 3 months.    Follow Up   6 months or sooner for new or concerning symptoms        Patient was given instructions and counseling regarding his condition or for health maintenance advice. Please see specific information pulled into the AVS if appropriate.     Signed,  Smitha Chew, APRN  01/10/2025     Dictated Utilizing Dragon Dictation: Please note that portions of this note were completed with a voice recognition program.  Part of this note may be an electronic transcription/translation of spoken language to printed text using the Dragon Dictation System.

## 2025-02-04 ENCOUNTER — OFFICE VISIT (OUTPATIENT)
Dept: FAMILY MEDICINE CLINIC | Facility: CLINIC | Age: 55
End: 2025-02-04
Payer: OTHER GOVERNMENT

## 2025-02-04 VITALS
WEIGHT: 205.1 LBS | HEIGHT: 68 IN | DIASTOLIC BLOOD PRESSURE: 76 MMHG | HEART RATE: 119 BPM | TEMPERATURE: 100.3 F | BODY MASS INDEX: 31.08 KG/M2 | SYSTOLIC BLOOD PRESSURE: 160 MMHG | OXYGEN SATURATION: 98 %

## 2025-02-04 DIAGNOSIS — J45.20 MILD INTERMITTENT ASTHMA WITHOUT COMPLICATION: ICD-10-CM

## 2025-02-04 DIAGNOSIS — J10.1 INFLUENZA A: ICD-10-CM

## 2025-02-04 DIAGNOSIS — I10 PRIMARY HYPERTENSION: ICD-10-CM

## 2025-02-04 DIAGNOSIS — J06.9 VIRAL UPPER RESPIRATORY TRACT INFECTION: Primary | ICD-10-CM

## 2025-02-04 LAB
EXPIRATION DATE: ABNORMAL
FLUAV AG UPPER RESP QL IA.RAPID: DETECTED
FLUBV AG UPPER RESP QL IA.RAPID: NOT DETECTED
INTERNAL CONTROL: ABNORMAL
Lab: ABNORMAL
SARS-COV-2 AG UPPER RESP QL IA.RAPID: NOT DETECTED

## 2025-02-04 PROCEDURE — 87428 SARSCOV & INF VIR A&B AG IA: CPT | Performed by: STUDENT IN AN ORGANIZED HEALTH CARE EDUCATION/TRAINING PROGRAM

## 2025-02-04 PROCEDURE — 99214 OFFICE O/P EST MOD 30 MIN: CPT | Performed by: STUDENT IN AN ORGANIZED HEALTH CARE EDUCATION/TRAINING PROGRAM

## 2025-02-04 RX ORDER — OSELTAMIVIR PHOSPHATE 75 MG/1
75 CAPSULE ORAL 2 TIMES DAILY
Qty: 10 CAPSULE | Refills: 0 | Status: SHIPPED | OUTPATIENT
Start: 2025-02-04

## 2025-02-04 NOTE — LETTER
February 4, 2025     Patient: Madi Rehman   YOB: 1970   Date of Visit: 2/4/2025       To Whom It May Concern:    It is my medical opinion that Madi Rehman may return to work on 2/10/25 due to a medical illness. Please excuse him from work until that date.          Sincerely,        Arnold Feng,     CC: No Recipients

## 2025-02-04 NOTE — PROGRESS NOTES
"Chief Complaint  Cough and Nasal Congestion (Since Sunday/)    Subjective      Madi Rehman is a 54 y.o. male who presents to [facility]     History of Present Illness  The patient is a 54-year-old male presenting with cough and congestion since Sunday.    Cough and Congestion  - Reports sudden onset of symptoms, including throat tickling, runny nose, cough, and prominent congestion  - Has mild body aches and a fever of 100.3°F  - Has not received the influenza vaccine this year  - Finds Vicks VapoRub beneficial and uses DayQuil and NyQuil  - Has not needed his inhaler and reports no shortness of breath  - Cough improves when supine  - Avoids Afrin nasal spray due to discomfort  - Has been absent from work since yesterday and needs a note for his employer    Asthma  - Has a history of mild asthma and uses an inhaler    Cardiac History  - No history of myocardial infarction or cerebrovascular accident  - Under cardiology care for cholesterol and blood pressure management with no significant cardiac disease    MEDICATIONS  Current: Vicks VapoRub, DayQuil, NyQuil    IMMUNIZATIONS  He is due for influenza vaccine.       Objective   Vital Signs:   Vitals:    02/04/25 1118   BP: 160/76   BP Location: Left arm   Patient Position: Sitting   Pulse: 119   Temp: 100.3 °F (37.9 °C)   TempSrc: Oral   SpO2: 98%   Weight: 93 kg (205 lb 1.6 oz)   Height: 172.7 cm (68\")     Body mass index is 31.19 kg/m².    Wt Readings from Last 3 Encounters:   02/04/25 93 kg (205 lb 1.6 oz)   01/10/25 95.7 kg (211 lb)   11/08/24 90.7 kg (200 lb)     BP Readings from Last 3 Encounters:   02/04/25 160/76   01/10/25 136/74   11/08/24 140/80       Health Maintenance   Topic Date Due    INFLUENZA VACCINE  07/01/2024    COVID-19 Vaccine (3 - 2024-25 season) 09/01/2024    ANNUAL PHYSICAL  09/08/2024    BMI FOLLOWUP  09/10/2025    LUNG CANCER SCREENING  10/10/2025    LIPID PANEL  12/06/2025    COLORECTAL CANCER " SCREENING  04/15/2029    TDAP/TD VACCINES (2 - Td or Tdap) 09/08/2033    HEPATITIS C SCREENING  Completed    Pneumococcal Vaccine 0-64  Completed    ZOSTER VACCINE  Completed       Physical Exam  HENT:      Head: Normocephalic and atraumatic.      Nose: Congestion present.      Mouth/Throat:      Mouth: Mucous membranes are moist.   Eyes:      Extraocular Movements: Extraocular movements intact.      Conjunctiva/sclera: Conjunctivae normal.   Cardiovascular:      Rate and Rhythm: Normal rate and regular rhythm.      Heart sounds: No murmur heard.     No friction rub. No gallop.   Pulmonary:      Effort: No respiratory distress.      Breath sounds: No wheezing, rhonchi or rales.   Abdominal:      General: Abdomen is flat. There is no distension.   Musculoskeletal:         General: No swelling.      Cervical back: Neck supple.   Skin:     General: Skin is warm and dry.   Neurological:      General: No focal deficit present.      Mental Status: He is alert and oriented to person, place, and time.   Psychiatric:         Mood and Affect: Mood normal.         Behavior: Behavior normal.         Thought Content: Thought content normal.         Judgment: Judgment normal.          Physical Exam      Result Review :  The following data was reviewed by: Arnold Feng DO on 02/04/2025:    CT Chest Low Dose Cancer Screening WO    Result Date: 10/11/2024  Impression: 1.No evidence of lung cancer. 2.Minimal dependent atelectasis. 3.Hepatic steatosis. Recommendation: Continue annual screening with LDCT Lung Rads Assessment: Lung-RADS L1 - Negative, <1% chance of malignancy. Electronically Signed: Dez Cornell MD  10/11/2024 9:31 AM EDT  Workstation ID: [id number]       Results  Laboratory Studies  Positive Influenza A test.       Procedures          Diagnoses and all orders for this visit:    1. Viral upper respiratory tract infection (Primary)  -     POCT SARS-CoV-2 + Flu Antigen HEMANT    2. Influenza A  -     oseltamivir  (Tamiflu) 75 MG capsule; Take 1 capsule by mouth 2 (Two) Times a Day.  Dispense: 10 capsule; Refill: 0    3. Mild intermittent asthma without complication         Assessment & Plan  1. Influenza A.   Educated on influenza contagion for 5-7 days. Prescribed Tamiflu 75 mg twice daily for 5 days. Continue DayQuil and NyQuil for symptom management. Take ibuprofen 600-800 mg three times daily for body aches and fever. Recommended Coricidin, which does not increase blood pressure. Consider telework options. Provided work note excusing him until Monday (2/10/2025). Instructed to wear a mask and practice hand hygiene when leaving the house.    2. Asthma.  Consider steroid therapy if increased shortness of breath occurs to manage inflammatory response to influenza.    3.  Hypertension  Informed about decongestants and Sudafed potentially increasing blood pressure.  Blood pressure increased today most likely due to patient's viral infection.      FOLLOW UP  Return if symptoms worsen or fail to improve.  Patient was given instructions and counseling regarding his condition or for health maintenance advice. Please see specific information pulled into the AVS if appropriate.     Patient or patient representative verbalized consent for the use of Ambient Listening during the visit with  Arnold Feng DO for chart documentation. 2/4/2025  11:41 EST    Arnold Feng DO  02/04/25  11:41 EST    CURRENT & DISCONTINUED MEDICATIONS  Current Outpatient Medications   Medication Instructions    albuterol sulfate  (90 Base) MCG/ACT inhaler 2 puffs, Inhalation, Every 4 Hours PRN    amLODIPine (NORVASC) 10 mg, Daily    cetirizine (zyrTEC) 10 MG tablet Take 1 tablet by mouth Daily.    oseltamivir (TAMIFLU) 75 mg, Oral, 2 Times Daily    rosuvastatin (CRESTOR) 10 mg, Oral, Daily       There are no discontinued medications.

## 2025-03-11 ENCOUNTER — OFFICE VISIT (OUTPATIENT)
Dept: FAMILY MEDICINE CLINIC | Facility: CLINIC | Age: 55
End: 2025-03-11
Payer: OTHER GOVERNMENT

## 2025-03-11 VITALS
HEART RATE: 92 BPM | SYSTOLIC BLOOD PRESSURE: 140 MMHG | OXYGEN SATURATION: 96 % | TEMPERATURE: 98.6 F | HEIGHT: 68 IN | WEIGHT: 206.9 LBS | DIASTOLIC BLOOD PRESSURE: 72 MMHG | BODY MASS INDEX: 31.36 KG/M2

## 2025-03-11 DIAGNOSIS — K76.0 HEPATIC STEATOSIS: ICD-10-CM

## 2025-03-11 DIAGNOSIS — I10 PRIMARY HYPERTENSION: ICD-10-CM

## 2025-03-11 DIAGNOSIS — F17.210 CIGARETTE SMOKER: ICD-10-CM

## 2025-03-11 DIAGNOSIS — I25.10 CORONARY ARTERY DISEASE INVOLVING NATIVE CORONARY ARTERY OF NATIVE HEART WITHOUT ANGINA PECTORIS: ICD-10-CM

## 2025-03-11 DIAGNOSIS — Z00.00 ANNUAL PHYSICAL EXAM: Primary | ICD-10-CM

## 2025-03-11 NOTE — PROGRESS NOTES
Chief Complaint  Chief Complaint   Patient presents with    Annual Exam    Hypertension       Subjective      Madi Rehman presents to [facility]  History of Present Illness  The patient presents for a routine checkup.    [facility]. He has been under the care of Cardiology, who have advised him to undergo repeat labs prior to his next appointment in 07/2025. He anticipates additional lab work in the coming month. He has an upcoming appointment with Gastroenterology in 04/2025. He has been discharged from Hematology/Oncology due to elevated blood counts, which were attributed to his smoking habit. He recently underwent colonoscopy.    His cholesterol levels have shown significant improvement, although his HDL remains suboptimal. He is currently on Crestor 10 mg for cholesterol management.    He does not monitor his blood pressure at home but reports that it has been consistently high. He admits to smoking a cigarette prior to his visit today.    SOCIAL HISTORY  The patient admits to smoking.    MEDICATIONS  Crestor      Objective     Medical History:  Past Medical History:   Diagnosis Date    Allergic     Hypertension     Kidney stone on left side     Seasonal allergies     Sleep apnea [id number]     Past Surgical History:   Procedure Laterality Date    AMPUTATION      COLONOSCOPY N/A 04/15/2024    Procedure: COLONOSCOPY WITH POLYPECTOMY;  Surgeon: Melquiades Malik MD;  Location: [facility] ENDOSCOPY;  Service: Gastroenterology;  Laterality: N/A;  COLON POLYP    CYSTOSCOPY W/ URETERAL STENT PLACEMENT Left 09/29/2023    Procedure: CYSTOSCOPY URETEROSCOPY RETROGRADE PYELOGRAM,BASKET STONE REMOVAL,LEFT URETERAL STENT EXCHANGE;  Surgeon: Sarah Aleman MD;  Location: [facility] MAIN OR;  Service: Urology;  Laterality: Left;    FOOT SURGERY Left     left 2nd toe removal    KIDNEY STONE SURGERY       URETEROSCOPY LASER LITHOTRIPSY WITH STENT INSERTION Left 09/15/2023    Procedure: CYSTOSCOPY URETEROSCOPY RETROGRADE PYELOGRAM STENT INSERTION, left;  Surgeon: Sarah Aleman MD;  Location: [facility] MAIN OR;  Service: Urology;  Laterality: Left;    VASECTOMY        Social History     Tobacco Use    Smoking status: Every Day     Current packs/day: 1.00     Average packs/day: 1 pack/day for 38.2 years (38.2 ttl pk-yrs)     Types: Cigarettes     Start date: 1/1/1987     Passive exposure: Current    Smokeless tobacco: Never   Vaping Use    Vaping status: Never Used   Substance Use Topics    Alcohol use: Yes     Alcohol/week: 1.0 standard drink of alcohol     Types: 1 Drinks containing 0.5 oz of alcohol per week     Comment: OCC    Drug use: Not Currently     Family History   Problem Relation Age of Onset    Hypertension Mother     Heart disease Mother     Hypertension Father     Cancer Father     Heart disease Father     Heart attack Father     Malig Hyperthermia Neg Hx     Colon cancer Neg Hx        Medications:  Prior to Admission medications    Medication Sig Start Date End Date Taking? Authorizing Provider   albuterol sulfate  (90 Base) MCG/ACT inhaler Inhale 2 puffs Every 4 (Four) Hours As Needed for Wheezing or Shortness of Air (congestion/cough). 5/11/22  Yes Emily Moran APRN   amLODIPine (NORVASC) 10 MG tablet Take 1 tablet by mouth Daily.   Yes ProviderHa MD   cetirizine (zyrTEC) 10 MG tablet Take 1 tablet by mouth Daily.   Yes ProviderHa MD   rosuvastatin (CRESTOR) 10 MG tablet Take 1 tablet by mouth Daily. 12/18/24  Yes Smitha Chew APRN   oseltamivir (Tamiflu) 75 MG capsule Take 1 capsule by mouth 2 (Two) Times a Day. 2/4/25   Arnold Feng DO        Allergies:   Patient has no known allergies.    Health Maintenance Due   Topic Date Due    COVID-19 Vaccine (3 - 2024-25 season) 09/01/2024    ANNUAL PHYSICAL  09/08/2024         Vital Signs:   /72 (BP  "Location: Left arm, Patient Position: Sitting, Cuff Size: Adult)   Pulse 92   Temp 98.6 °F (37 °C) (Oral)   Ht 172.7 cm (68\")   Wt 93.8 kg (206 lb 14.4 oz)   SpO2 96%   BMI 31.46 kg/m²     Wt Readings from Last 3 Encounters:   03/11/25 93.8 kg (206 lb 14.4 oz)   02/04/25 93 kg (205 lb 1.6 oz)   01/10/25 95.7 kg (211 lb)     BP Readings from Last 3 Encounters:   03/11/25 140/72   02/04/25 160/76   01/10/25 136/74     Physical Exam  Vitals reviewed.   Constitutional:       Appearance: Normal appearance. He is well-developed.   HENT:      Head: Normocephalic and atraumatic.   Eyes:      Conjunctiva/sclera: Conjunctivae normal.      Pupils: Pupils are equal, round, and reactive to light.   Cardiovascular:      Rate and Rhythm: Normal rate and regular rhythm.      Heart sounds: No murmur heard.     No friction rub. No gallop.   Pulmonary:      Effort: Pulmonary effort is normal.      Breath sounds: Normal breath sounds. No wheezing or rhonchi.   Abdominal:      General: Bowel sounds are normal. There is no distension.      Palpations: Abdomen is soft.      Tenderness: There is no abdominal tenderness.   Skin:     General: Skin is warm and dry.   Neurological:      Mental Status: He is alert and oriented to person, place, and time.      Cranial Nerves: No cranial nerve deficit.   Psychiatric:         Mood and Affect: Mood and affect normal.         Behavior: Behavior normal.         Thought Content: Thought content normal.         Judgment: Judgment normal.       Physical Exam  Lungs were auscultated.    Vital Signs  Blood pressure reading is 140/72.      Result Review :    The following data was reviewed by CHANTAL Chaves on 03/11/25 at 07:43 EDT:    Common labs          6/26/2024    12:35 10/4/2024    08:31 12/6/2024    09:45   Common Labs   WBC 13.23  9.38     Hemoglobin 15.6  16.4     Hematocrit 45.1  48.4     Platelets 245  223     Total Cholesterol   142    Triglycerides   83    HDL Cholesterol   " 27    LDL Cholesterol    99        No Images in the past 120 days found..    Results  Laboratory Studies  LDL cholesterol improved from 145 to 99.               Assessment and Plan    Diagnoses and all orders for this visit:    1. Annual physical exam (Primary)    2. Primary hypertension    3. Hepatic steatosis    4. Coronary artery disease involving native coronary artery of native heart without angina pectoris    5. Cigarette smoker       Assessment & Plan  1. Routine health maintenance.  His colon cancer screening is current, with a colonoscopy performed less than a year ago. He is scheduled to see Gastroenterology in 04/2025. He is advised to link his [facility] to this system for seamless access to his medical records.    2. Hyperlipidemia.  His LDL cholesterol has significantly improved from 145 to 99, but his HDL remains low. He is currently on Crestor 10 mg for cholesterol management.    3. Hypertension.  His blood pressure today is 140/72, which is lower than last month's reading of 160/76. He reported smoking a cigarette before today's visit, which may have contributed to the elevated reading. He is advised to monitor his blood pressure at home for more accurate readings.    Follow-up  The patient will follow up in 6 months or as needed.    PROCEDURE  The patient underwent a colonoscopy less than a year ago.          Smoking Cessation:    Madi Rehman  reports that he has been smoking cigarettes. He started smoking about 38 years ago. He has a 38.2 pack-year smoking history. He has been exposed to tobacco smoke. He has never used smokeless tobacco. I have educated him on the risk of diseases from using tobacco products such as cancer, COPD, and heart disease.     I advised him to quit and he is not willing to quit.    I spent 3  minutes counseling the patient.            Follow Up   Return in about 6 months (around 9/11/2025), or if symptoms worsen or fail to improve, for Next scheduled follow  up.  Patient was given instructions and counseling regarding his condition or for health maintenance advice. Please see specific information pulled into the AVS if appropriate.     Please note that portions of this note were completed with a voice recognition program.    Patient or patient representative verbalized consent for the use of Ambient Listening during the visit with  CHANTAL Chaves for chart documentation. 3/11/2025  07:41 EDT

## 2025-04-02 ENCOUNTER — LAB (OUTPATIENT)
Facility: HOSPITAL | Age: 55
End: 2025-04-02
Payer: OTHER GOVERNMENT

## 2025-04-02 ENCOUNTER — OFFICE VISIT (OUTPATIENT)
Dept: GASTROENTEROLOGY | Facility: CLINIC | Age: 55
End: 2025-04-02
Payer: OTHER GOVERNMENT

## 2025-04-02 VITALS
BODY MASS INDEX: 30.77 KG/M2 | DIASTOLIC BLOOD PRESSURE: 76 MMHG | HEART RATE: 89 BPM | WEIGHT: 203 LBS | SYSTOLIC BLOOD PRESSURE: 142 MMHG | OXYGEN SATURATION: 100 % | HEIGHT: 68 IN

## 2025-04-02 DIAGNOSIS — K76.0 HEPATIC STEATOSIS: ICD-10-CM

## 2025-04-02 DIAGNOSIS — R74.8 ELEVATED LIVER ENZYMES: ICD-10-CM

## 2025-04-02 DIAGNOSIS — E66.811 CLASS 1 OBESITY WITH BODY MASS INDEX (BMI) OF 30.0 TO 30.9 IN ADULT, UNSPECIFIED OBESITY TYPE, UNSPECIFIED WHETHER SERIOUS COMORBIDITY PRESENT: ICD-10-CM

## 2025-04-02 DIAGNOSIS — K76.0 HEPATIC STEATOSIS: Primary | ICD-10-CM

## 2025-04-02 LAB
ALBUMIN SERPL-MCNC: 4.5 G/DL (ref 3.5–5.2)
ALP SERPL-CCNC: 88 U/L (ref 39–117)
ALPHA-FETOPROTEIN: 6.54 NG/ML (ref 0–8.3)
ALPHA1 GLOB MFR UR ELPH: 160 MG/DL (ref 90–200)
ALT SERPL W P-5'-P-CCNC: 35 U/L (ref 1–41)
AST SERPL-CCNC: 23 U/L (ref 1–40)
BILIRUB CONJ SERPL-MCNC: 0.2 MG/DL (ref 0–0.3)
BILIRUB INDIRECT SERPL-MCNC: 0.2 MG/DL
BILIRUB SERPL-MCNC: 0.4 MG/DL (ref 0–1.2)
CERULOPLASMIN SERPL-MCNC: 20 MG/DL (ref 16–31)
FERRITIN SERPL-MCNC: 552 NG/ML (ref 30–400)
HAV IGM SERPL QL IA: NORMAL
HBV CORE IGM SERPL QL IA: NORMAL
HBV SURFACE AG SERPL QL IA: NORMAL
HCV AB SER QL: NORMAL
INR PPP: 1 (ref 0.86–1.15)
IRON 24H UR-MRATE: 73 MCG/DL (ref 59–158)
IRON SATN MFR SERPL: 21 % (ref 20–50)
PROT SERPL-MCNC: 7.9 G/DL (ref 6–8.5)
PROTHROMBIN TIME: 13.6 SECONDS (ref 11.8–14.9)
TIBC SERPL-MCNC: 340 MCG/DL (ref 298–536)
TRANSFERRIN SERPL-MCNC: 228 MG/DL (ref 200–360)

## 2025-04-02 PROCEDURE — 82525 ASSAY OF COPPER: CPT

## 2025-04-02 PROCEDURE — 82728 ASSAY OF FERRITIN: CPT

## 2025-04-02 PROCEDURE — 86015 ACTIN ANTIBODY EACH: CPT

## 2025-04-02 PROCEDURE — 86381 MITOCHONDRIAL ANTIBODY EACH: CPT

## 2025-04-02 PROCEDURE — 84466 ASSAY OF TRANSFERRIN: CPT

## 2025-04-02 PROCEDURE — 36415 COLL VENOUS BLD VENIPUNCTURE: CPT

## 2025-04-02 PROCEDURE — 80074 ACUTE HEPATITIS PANEL: CPT

## 2025-04-02 PROCEDURE — 82103 ALPHA-1-ANTITRYPSIN TOTAL: CPT

## 2025-04-02 PROCEDURE — 85610 PROTHROMBIN TIME: CPT

## 2025-04-02 PROCEDURE — 86038 ANTINUCLEAR ANTIBODIES: CPT

## 2025-04-02 PROCEDURE — 82105 ALPHA-FETOPROTEIN SERUM: CPT

## 2025-04-02 PROCEDURE — 99214 OFFICE O/P EST MOD 30 MIN: CPT

## 2025-04-02 PROCEDURE — 82390 ASSAY OF CERULOPLASMIN: CPT

## 2025-04-02 PROCEDURE — 83540 ASSAY OF IRON: CPT

## 2025-04-02 PROCEDURE — 80076 HEPATIC FUNCTION PANEL: CPT

## 2025-04-02 NOTE — PROGRESS NOTES
Chief Complaint  Hepatic steatosis    Patient or patient representative verbalized consent for the use of Ambient Listening during the visit with  CHANTAL Hummel for chart documentation. 4/2/2025  09:02 EDT    Madi Rehman is a 54 y.o. male who presents to [facility]- [facility] for hepatic steatosis.    History of Present Illness  The patient is a 54-year-old male who presents to the office for fatty liver.    He has no prior diagnosis of fatty liver disease. He reports no known exposure to hepatitis C. He does not have diabetes and reports no gastrointestinal complaints. He has a history of alcohol consumption, including hard liquor, which he has since discontinued. His current alcohol intake is limited to a few beers per week, a reduction from his previous frequency of consumption following his divorce. Approximately one month ago, he [facility], resulting in a healthier diet with increased protein intake.      Gastrointestinal History  Colonoscopy 4/15/2024 by Dr. Malik - 3mm polyp in descending colon and grade I internal hemorrhoids. Repeat 5 years.     Past Medical History:   Diagnosis Date    Allergic     Hypertension     Kidney stone on left side     Seasonal allergies     Sleep apnea [id number]       Past Surgical History:   Procedure Laterality Date    AMPUTATION      COLONOSCOPY N/A 04/15/2024    Procedure: COLONOSCOPY WITH POLYPECTOMY;  Surgeon: Melquiades Malki MD;  Location: [facility] ENDOSCOPY;  Service: Gastroenterology;  Laterality: N/A;  COLON POLYP    CYSTOSCOPY W/ URETERAL STENT PLACEMENT Left 09/29/2023    Procedure: CYSTOSCOPY URETEROSCOPY RETROGRADE PYELOGRAM,BASKET STONE REMOVAL,LEFT URETERAL STENT EXCHANGE;  Surgeon: Sarah Aleman MD;  Location: [facility] MAIN OR;  Service: Urology;  Laterality: Left;    FOOT SURGERY Left     left 2nd toe removal    KIDNEY STONE SURGERY      URETEROSCOPY LASER  "LITHOTRIPSY WITH STENT INSERTION Left 09/15/2023    Procedure: CYSTOSCOPY URETEROSCOPY RETROGRADE PYELOGRAM STENT INSERTION, left;  Surgeon: Sarah Aleman MD;  Location: [facility];  Service: Urology;  Laterality: Left;    VASECTOMY           Current Outpatient Medications:     albuterol sulfate  (90 Base) MCG/ACT inhaler, Inhale 2 puffs Every 4 (Four) Hours As Needed for Wheezing or Shortness of Air (congestion/cough)., Disp: 8 g, Rfl: 5    amLODIPine (NORVASC) 10 MG tablet, Take 1 tablet by mouth Daily., Disp: , Rfl:     cetirizine (zyrTEC) 10 MG tablet, Take 1 tablet by mouth Daily., Disp: , Rfl:     rosuvastatin (CRESTOR) 10 MG tablet, Take 1 tablet by mouth Daily., Disp: 90 tablet, Rfl: 3     No Known Allergies    Family History   Problem Relation Age of Onset    Hypertension Mother     Heart disease Mother     Hypertension Father     Cancer Father     Heart disease Father     Heart attack Father     Malig Hyperthermia Neg Hx     Colon cancer Neg Hx         Social History     Social History Narrative    Not on file       Objective       Vital Signs:   /76 (BP Location: Right arm, Patient Position: Sitting, Cuff Size: Adult)   Pulse 89   Ht 172.7 cm (67.99\")   Wt 92.1 kg (203 lb)   SpO2 100%   BMI 30.87 kg/m²       Physical Exam  Constitutional:       Appearance: Normal appearance. He is normal weight.   HENT:      Head: Normocephalic and atraumatic.      Nose: Nose normal.   Pulmonary:      Effort: Pulmonary effort is normal.   Skin:     General: Skin is warm and dry.   Neurological:      Mental Status: He is alert and oriented to person, place, and time. Mental status is at baseline.   Psychiatric:         Mood and Affect: Mood normal.         Behavior: Behavior normal.         Thought Content: Thought content normal.         Judgment: Judgment normal.         Result Review :       CBC w/diff          6/26/2024    12:35 10/4/2024    08:31   CBC w/Diff   WBC 13.23  9.38    RBC 4.78  " 5.03    Hemoglobin 15.6  16.4    Hematocrit 45.1  48.4    MCV 94.4  96.2    MCH 32.6  32.6    MCHC 34.6  33.9    RDW 13.4  13.4    Platelets 245  223    Neutrophil Rel % 66.7  61.4    Immature Granulocyte Rel % 0.2  0.2    Lymphocyte Rel % 24.5  29.3    Monocyte Rel % 6.7  6.9    Eosinophil Rel % 1.5  1.8    Basophil Rel % 0.4  0.4                Assessment and Plan    Diagnoses and all orders for this visit:    1. Hepatic steatosis (Primary)  -     AFP Tumor Marker; Future  -     Alpha - 1 - Antitrypsin; Future  -     JACOB; Future  -     Hepatic Function Panel; Future  -     Hepatitis Panel, Acute; Future  -     Iron Profile; Future  -     Ferritin; Future  -     Copper, Serum; Future  -     Protime-INR; Future  -     Ceruloplasmin; Future  -     Mitochondrial Antibodies, M2; Future  -     Anti-Smooth Muscle Antibody Titer; Future  -     US Liver; Future    2. Elevated liver enzymes  -     AFP Tumor Marker; Future  -     Alpha - 1 - Antitrypsin; Future  -     JACOB; Future  -     Hepatic Function Panel; Future  -     Hepatitis Panel, Acute; Future  -     Iron Profile; Future  -     Ferritin; Future  -     Copper, Serum; Future  -     Protime-INR; Future  -     Ceruloplasmin; Future  -     Mitochondrial Antibodies, M2; Future  -     Anti-Smooth Muscle Antibody Titer; Future  -     US Liver; Future    3. Class 1 obesity with body mass index (BMI) of 30.0 to 30.9 in adult, unspecified obesity type, unspecified whether serious comorbidity present      Assessment & Plan  1. Hepatic steatosis.  He has no prior diagnosis of fatty liver disease. He has a history of alcohol consumption, including hard liquor, which he has since discontinued. His current alcohol intake is limited to a few beers per week, a reduction from his previous frequency of consumption following his divorce. Approximately one month ago, he [facility], resulting in a healthier diet with increased protein intake.   Patient will proceed with full liver workup as well as ultrasound of the liver. He has been advised to adopt a low-carbohydrate, low-sugar diet, with a focus on reducing the intake of breads, potatoes, pastas, and liquid sugars such as sodas, soft drinks, and sweet tea. The importance of moderation in diet and the benefits of a protein-based diet for energy were emphasized.  Advised to abstain from alcohol.      * Surgery not found *        Follow Up   Return in about 6 months (around 10/2/2025).  Patient was given instructions and counseling regarding his condition or for health maintenance advice. Please see specific information pulled into the AVS if appropriate.

## 2025-04-02 NOTE — PATIENT INSTRUCTIONS
Fatty Liver Disease (Steatotic Liver Disease): What to Know    Your liver is an organ with many jobs. It makes proteins and helps change food into energy. It also gets rid of harmful things in your blood and absorbs vitamins from food.  Fatty liver disease happens when too much fat builds up in your liver cells. It's also called steatotic liver disease.  In many cases, fatty liver disease doesn't cause symptoms. But over time, it can cause irritation and swelling. This can lead to other liver problems, such as:  Cirrhosis, or scarring of the liver.  Liver cancer.  Liver failure.  What are the causes?  Fatty liver disease may be caused by:  Being overweight.  Having:  High cholesterol.  High blood pressure.  Cushing syndrome.  Not getting enough nutrients in your diet.  Other causes include:  Certain drugs.  Poisons.  Some infections caused by a germ called a virus.  What increases the risk?  You're more likely to get fatty liver disease if:  You drink alcohol.  You're overweight.  You have diabetes.  You have hepatitis.  You have a high triglyceride level.  You're pregnant.  What are the signs or symptoms?  You may not have symptoms. If you do, they may include:  Feeling weak and tired.  Losing weight.  Feeling like you may throw up.  Throwing up.  Jaundice. This is when your skin or the white parts of your eyes turn yellow.  Swelling in your belly or legs.  Tenderness in the right-upper part of your belly.  How is this diagnosed?  Fatty liver disease may be diagnosed based on your medical history and an exam. You may also need tests. These may include:  Blood tests.  An ultrasound.  A CT scan.  An MRI.  A biopsy. This is when a small piece of tissue is removed from your liver for testing.  How is this treated?  Fatty liver disease is often caused by other conditions. You may need to take medicines and make changes to your daily life. These changes may help you manage conditions, such as:  Alcohol use disorder. This  is a condition where you may not be able to stop drinking.  High cholesterol.  Diabetes.  Being overweight.  Follow these instructions at home:  Eat healthy. Work with your health care provider or an expert in healthy eating called a dietitian. They can help you make an eating plan.  Get enough exercise.  This can help you lose weight. It can also help you manage your cholesterol and diabetes.  Talk to your provider about an exercise plan. Ask what things are best for you to do.  Do not drink alcohol. If you have trouble quitting, ask your provider for help.  Take your medicines only as told.  Keep all follow-up visits. Your provider will check if you're getting better.  Contact a health care provider if:  You can't control your blood sugar. This is extra important if you have diabetes.  You have a fever.  You have swelling in your belly or legs.  You have belly pain.  You have jaundice.  You feel like you may throw up.  You throw up.  Get help right away if:  You throw up, and it looks like:  Bright red blood.  Coffee grounds.  You throw up something that looks like coffee ground.  Your poop looks bloody or black.  You get confused.  These symptoms may be an emergency. Call 911 right away.  Do not wait to see if the symptoms will go away.  Do not drive yourself to the hospital.  This information is not intended to replace advice given to you by your health care provider. Make sure you discuss any questions you have with your health care provider.  Document Revised: 06/06/2024 Document Reviewed: 06/06/2024  [employer] Patient Education © 2024 [employer]

## 2025-04-03 ENCOUNTER — PATIENT ROUNDING (BHMG ONLY) (OUTPATIENT)
Dept: GASTROENTEROLOGY | Facility: CLINIC | Age: 55
End: 2025-04-03
Payer: OTHER GOVERNMENT

## 2025-04-03 LAB
ANA SER QL: NEGATIVE
MITOCHONDRIA M2 IGG SER-ACNC: 33 UNITS (ref 0–20)
SMA IGG SER-ACNC: 4 UNITS (ref 0–19)

## 2025-04-03 NOTE — PROGRESS NOTES
4/3/2025      Hello, may I speak with Madi Rehman     My name is Jeison. I am calling from [facility] [place]. I show that you had a recent visit with CHANTAL Hernandez.    Before we get started may I verify your date of birth? 1970    I am calling to officially welcome you to our practice and ask about your recent visit. Is this a good time to talk? No I left pt a VM     Tell me about your visit with us. What things went well?    We strive to ensure that we protect your safety and privacy. Is there anything we could have done to improve this during your visit?        We're always looking for ways to make our patients' experiences even better. Do you have recommendations on ways we may improve?    Overall were you satisfied with your first visit to our practice?    I appreciate you taking the time to speak with me today. Is there anything else I can do for you?    I am glad to hear that you had a very good visit and I appreciate you taking the time to provide feedback on this call. We would greatly appreciate you filling out a survey if you receive one in the mail, email or text. This is a great opportunity to provide any additional feedback that you may think of after this call as well.       Thank you, and have a great day.

## 2025-04-04 ENCOUNTER — RESULTS FOLLOW-UP (OUTPATIENT)
Dept: GASTROENTEROLOGY | Facility: CLINIC | Age: 55
End: 2025-04-04
Payer: OTHER GOVERNMENT

## 2025-04-04 DIAGNOSIS — R74.8 ELEVATED LIVER ENZYMES: ICD-10-CM

## 2025-04-04 DIAGNOSIS — K76.0 HEPATIC STEATOSIS: Primary | ICD-10-CM

## 2025-04-06 LAB — COPPER SERPL-MCNC: 90 UG/DL (ref 69–132)

## 2025-04-23 ENCOUNTER — HOSPITAL ENCOUNTER (OUTPATIENT)
Dept: ULTRASOUND IMAGING | Facility: HOSPITAL | Age: 55
Discharge: HOME | End: 2025-04-23
Payer: OTHER GOVERNMENT

## 2025-04-23 DIAGNOSIS — K76.0 HEPATIC STEATOSIS: ICD-10-CM

## 2025-04-23 DIAGNOSIS — R74.8 ELEVATED LIVER ENZYMES: ICD-10-CM

## 2025-04-23 PROCEDURE — 76705 ECHO EXAM OF ABDOMEN: CPT

## 2025-07-07 ENCOUNTER — CLINICAL SUPPORT (OUTPATIENT)
Dept: FAMILY MEDICINE CLINIC | Facility: CLINIC | Age: 55
End: 2025-07-07
Payer: OTHER GOVERNMENT

## 2025-07-07 DIAGNOSIS — R74.8 ELEVATED LIVER ENZYMES: ICD-10-CM

## 2025-07-07 DIAGNOSIS — K76.0 HEPATIC STEATOSIS: ICD-10-CM

## 2025-07-07 DIAGNOSIS — E78.2 MIXED HYPERLIPIDEMIA: ICD-10-CM

## 2025-07-07 LAB
ALBUMIN SERPL-MCNC: 4.4 G/DL (ref 3.5–5.2)
ALBUMIN/GLOB SERPL: 1.4 G/DL
ALP SERPL-CCNC: 76 U/L (ref 39–117)
ALT SERPL W P-5'-P-CCNC: 30 U/L (ref 1–41)
ANION GAP SERPL CALCULATED.3IONS-SCNC: 14 MMOL/L (ref 5–15)
AST SERPL-CCNC: 20 U/L (ref 1–40)
BILIRUB CONJ SERPL-MCNC: 0.1 MG/DL (ref 0–0.3)
BILIRUB SERPL-MCNC: 0.3 MG/DL (ref 0–1.2)
BUN SERPL-MCNC: 12 MG/DL (ref 6–20)
BUN/CREAT SERPL: 17.1 (ref 7–25)
CALCIUM SPEC-SCNC: 10 MG/DL (ref 8.6–10.5)
CHLORIDE SERPL-SCNC: 105 MMOL/L (ref 98–107)
CHOLEST SERPL-MCNC: 136 MG/DL (ref 0–200)
CO2 SERPL-SCNC: 19 MMOL/L (ref 22–29)
CREAT SERPL-MCNC: 0.7 MG/DL (ref 0.76–1.27)
EGFRCR SERPLBLD CKD-EPI 2021: 108.8 ML/MIN/1.73
GLOBULIN UR ELPH-MCNC: 3.1 GM/DL
GLUCOSE SERPL-MCNC: 105 MG/DL (ref 65–99)
HDLC SERPL-MCNC: 30 MG/DL (ref 40–60)
LDLC SERPL CALC-MCNC: 83 MG/DL (ref 0–100)
LDLC/HDLC SERPL: 2.67 {RATIO}
POTASSIUM SERPL-SCNC: 4.3 MMOL/L (ref 3.5–5.2)
PROT SERPL-MCNC: 7.5 G/DL (ref 6–8.5)
SODIUM SERPL-SCNC: 138 MMOL/L (ref 136–145)
TRIGL SERPL-MCNC: 130 MG/DL (ref 0–150)
VLDLC SERPL-MCNC: 23 MG/DL (ref 5–40)

## 2025-07-07 PROCEDURE — 80053 COMPREHEN METABOLIC PANEL: CPT | Performed by: FAMILY MEDICINE

## 2025-07-07 PROCEDURE — 80061 LIPID PANEL: CPT | Performed by: FAMILY MEDICINE

## 2025-07-07 PROCEDURE — 83695 ASSAY OF LIPOPROTEIN(A): CPT | Performed by: FAMILY MEDICINE

## 2025-07-07 PROCEDURE — 82172 ASSAY OF APOLIPOPROTEIN: CPT | Performed by: FAMILY MEDICINE

## 2025-07-07 PROCEDURE — 86381 MITOCHONDRIAL ANTIBODY EACH: CPT | Performed by: FAMILY MEDICINE

## 2025-07-07 PROCEDURE — 82248 BILIRUBIN DIRECT: CPT

## 2025-07-08 ENCOUNTER — RESULTS FOLLOW-UP (OUTPATIENT)
Dept: FAMILY MEDICINE CLINIC | Facility: CLINIC | Age: 55
End: 2025-07-08
Payer: OTHER GOVERNMENT

## 2025-07-08 LAB
LPA SERPL-SCNC: 150.7 NMOL/L
MITOCHONDRIA M2 IGG SER-ACNC: 36.4 UNITS (ref 0–20)

## 2025-07-09 LAB — APO B SERPL-MCNC: 83 MG/DL

## 2025-07-10 ENCOUNTER — OFFICE VISIT (OUTPATIENT)
Dept: CARDIOLOGY | Facility: CLINIC | Age: 55
End: 2025-07-10
Payer: OTHER GOVERNMENT

## 2025-07-10 VITALS
DIASTOLIC BLOOD PRESSURE: 79 MMHG | HEIGHT: 68 IN | BODY MASS INDEX: 31.36 KG/M2 | SYSTOLIC BLOOD PRESSURE: 149 MMHG | HEART RATE: 85 BPM | WEIGHT: 206.9 LBS

## 2025-07-10 DIAGNOSIS — I25.10 CORONARY ARTERY CALCIFICATION SEEN ON CAT SCAN: ICD-10-CM

## 2025-07-10 DIAGNOSIS — E78.2 MIXED HYPERLIPIDEMIA: ICD-10-CM

## 2025-07-10 DIAGNOSIS — I10 ESSENTIAL HYPERTENSION: Primary | ICD-10-CM

## 2025-07-10 PROCEDURE — 99214 OFFICE O/P EST MOD 30 MIN: CPT | Performed by: INTERNAL MEDICINE

## 2025-07-10 RX ORDER — ROSUVASTATIN CALCIUM 10 MG/1
20 TABLET, COATED ORAL DAILY
Qty: 90 TABLET | Refills: 3 | Status: SHIPPED | OUTPATIENT
Start: 2025-07-10

## 2025-07-10 NOTE — PROGRESS NOTES
CARDIOLOGY FOLLOW-UP PROGRESS NOTE        Chief Complaint  Essential hypertension and Follow-up    Subjective            Madi Rehman presents to [facility] CARDIOLOGY  History of Present Illness      She comes for a follow-up.  He denies angina or dyspnea.  He reports some mild ankle swelling which may be due to the use of amlodipine.  His lipid profile showed an LDL of 83 with HDL of 30.  LP(a) was 150       Past History:    Medical History:  Past Medical History:   Diagnosis Date    Allergic     Hypertension     Kidney stone on left side     Seasonal allergies     Sleep apnea [id number]       Surgical History: has a past surgical history that includes Foot surgery (Left); ureteroscopy laser lithotripsy with stent insertion (Left, 09/15/2023); Cystoscopy w/ ureteral stent placement (Left, 09/29/2023); Colonoscopy (N/A, 04/15/2024); Vasectomy; Amputation; and Kidney stone surgery.     Family History: family history includes Cancer in his father; Heart attack in his father; Heart disease in his father and mother; Hypertension in his father and mother.     Social History: reports that he has been smoking cigarettes. He started smoking about 38 years ago. He has a 38.5 pack-year smoking history. He has been exposed to tobacco smoke. He has never used smokeless tobacco. He reports current alcohol use of about 4.0 standard drinks of alcohol per week. He reports that he does not currently use drugs.    Allergies: Patient has no known allergies.    Current Outpatient Medications on File Prior to Visit   Medication Sig    albuterol sulfate  (90 Base) MCG/ACT inhaler Inhale 2 puffs Every 4 (Four) Hours As Needed for Wheezing or Shortness of Air (congestion/cough).    amLODIPine (NORVASC) 10 MG tablet Take 1 tablet by mouth Daily.    cetirizine (zyrTEC) 10 MG tablet Take 1 tablet by mouth Daily.    [DISCONTINUED] rosuvastatin (CRESTOR) 10 MG tablet Take 1 tablet by mouth Daily.     No current  "facility-administered medications on file prior to visit.          Review of Systems   All systems reviewed and negative except for minimal ankle swelling    Objective     /79 (BP Location: Left arm, Patient Position: Sitting, Cuff Size: Adult)   Pulse 85   Ht 172.7 cm (67.99\")   Wt 93.8 kg (206 lb 14.4 oz)   BMI 31.47 kg/m²       Physical Exam    General : Alert, awake, no acute distress  Neck : Supple, no carotid bruit, no jugular venous distention  CVS : Regular rate and rhythm, no murmur, rubs or gallops  Lungs: Clear to auscultation bilaterally, no crackles or rhonchi  Abdomen: Soft, nontender, bowel sounds heard in all 4 quadrants  Extremities: Warm, well-perfused, minimal ankle edema  Neurologic: No apparent motor deficit    Result Review :     The following data was reviewed by: Douglas Yu MD on 07/10/2025:    CMP          4/2/2025    09:35 7/7/2025    08:45   CMP   Glucose  105    BUN  12.0    Creatinine  0.70    EGFR  108.8    Sodium  138    Potassium  4.3    Chloride  105    Calcium  10.0    Total Protein 7.9  7.5    Albumin 4.5  4.4    Globulin  3.1    Total Bilirubin 0.4  0.3    Alkaline Phosphatase 88  76    AST (SGOT) 23  20    ALT (SGPT) 35  30    Albumin/Globulin Ratio  1.4    BUN/Creatinine Ratio  17.1    Anion Gap  14.0      CBC          10/4/2024    08:31   CBC   WBC 9.38    RBC 5.03    Hemoglobin 16.4    Hematocrit 48.4    MCV 96.2    MCH 32.6    MCHC 33.9    RDW 13.4    Platelets 223        Lipid Panel          12/6/2024    09:45 7/7/2025    08:45   Lipid Panel   Total Cholesterol 142  136    Triglycerides 83  130    HDL Cholesterol 27  30    VLDL Cholesterol 16  23    LDL Cholesterol  99  83    LDL/HDL Ratio 3.64  2.67           Data reviewed: Cardiology studies        Results for orders placed during the hospital encounter of 11/22/24    Adult Transthoracic Echo Complete W/ Cont if Necessary Per Protocol    Interpretation Summary    Left ventricular ejection fraction appears to " be 61 - 65%.    Left ventricular wall thickness is consistent with mild concentric hypertrophy.    Left ventricular diastolic function is consistent with (grade I) impaired relaxation.                   Assessment and Plan        Diagnoses and all orders for this visit:    1. Essential hypertension (Primary)    2. Coronary artery calcification seen on CAT scan    3. Mixed hyperlipidemia    Other orders  -     rosuvastatin (CRESTOR) 10 MG tablet; Take 2 tablets by mouth Daily.  Dispense: 90 tablet; Refill: 3          The patient has extensive coronary calcifications but no cardiac symptoms.  I will increase the rosuvastatin to 20 mg oral daily for goal LDL less than 60 and LP(a) less than 70.  Continue with blood pressure control, goal less than 120/80 mmHg.  Advised to keep a home blood pressure log and to notify us if the numbers are higher so we can adjust his regimen.  The ankle swelling may be due to amlodipine.  He was advised to wear compression stockings.  Continue with lifestyle modification and healthy diet.  Continue regular exercise.    Follow Up     Return in about 6 months (around 1/10/2026) for LBunch.    Patient was given instructions and counseling regarding his condition or for health maintenance advice. Please see specific information pulled into the AVS if appropriate.

## 2025-07-15 NOTE — TELEPHONE ENCOUNTER
Pt returned the call and verbalized understanding of results, will keep his follow up appointment as scheduled.

## (undated) DEVICE — NITINOL WIRE WITH HYDROPHILIC TIP: Brand: SENSOR

## (undated) DEVICE — THE SINGLE USE ETRAP – POLYP TRAP IS USED FOR SUCTION RETRIEVAL OF ENDOSCOPICALLY REMOVED POLYPS.: Brand: ETRAP

## (undated) DEVICE — SOLIDIFIER LIQLOC PLS 1500CC BT

## (undated) DEVICE — SNAR POLYP CAPTIFLEX XS/OVL 11X2.4MM 240CM 1P/U

## (undated) DEVICE — URETERAL ACCESS SHEATH SET: Brand: NAVIGATOR HD

## (undated) DEVICE — NITINOL STONE RETRIEVAL BASKET: Brand: ESCAPE

## (undated) DEVICE — SYS IRR PUMP SGL ACTN VAC SYR 10CC

## (undated) DEVICE — SOL IRRG H2O PL/BG 1000ML STRL

## (undated) DEVICE — SOL IRRG H2O BG 3000ML STRL

## (undated) DEVICE — CYSTO/BLADDER IRRIGATION SET, REGULATING CLAMP

## (undated) DEVICE — GW ULTRATRACK HYBRID STR/TP .035IN 3X150CM EA/5

## (undated) DEVICE — Device

## (undated) DEVICE — TOWEL,OR,DSP,ST,BLUE,STD,4/PK,20PK/CS: Brand: MEDLINE

## (undated) DEVICE — CATH URETRL OPEN END W/CONNECT 5F 70CM

## (undated) DEVICE — Y-TYPE TUR/BLADDER IRRIGATION SET, REGULATING CLAMP

## (undated) DEVICE — LINER SURG CANSTR SXN S/RIGD 1500CC

## (undated) DEVICE — GLV SURG SENSICARE SLT PF LF 7 STRL

## (undated) DEVICE — SKIN PREP TRAY W/CHG: Brand: MEDLINE INDUSTRIES, INC.

## (undated) DEVICE — BASIC SINGLE BASIN-LF: Brand: MEDLINE INDUSTRIES, INC.

## (undated) DEVICE — CYSTO PACK: Brand: MEDLINE INDUSTRIES, INC.

## (undated) DEVICE — SOL IRR NACL 0.9PCT 3000ML

## (undated) DEVICE — CATH URETRL FLXITP POLLACK STD 5F 70CM

## (undated) DEVICE — ENDOSCOPIC VALVE WITH ADAPTER.: Brand: SURSEAL® II

## (undated) DEVICE — Device: Brand: DEFENDO AIR/WATER/SUCTION AND BIOPSY VALVE

## (undated) DEVICE — GOWN,REINFORCE,POLY,SIRUS,BREATH SLV,XLG: Brand: MEDLINE

## (undated) DEVICE — CATH 2L URETRL HC 6F 50CM

## (undated) DEVICE — CONN JET HYDRA H20 AUXILIARY DISP

## (undated) DEVICE — GW SUREGLIDE FLXTIP STR/TP .035 3X150CM EA/5